# Patient Record
Sex: FEMALE | Race: WHITE | NOT HISPANIC OR LATINO
[De-identification: names, ages, dates, MRNs, and addresses within clinical notes are randomized per-mention and may not be internally consistent; named-entity substitution may affect disease eponyms.]

---

## 2017-01-17 ENCOUNTER — MEDICATION RENEWAL (OUTPATIENT)
Age: 72
End: 2017-01-17

## 2017-01-24 ENCOUNTER — MEDICATION RENEWAL (OUTPATIENT)
Age: 72
End: 2017-01-24

## 2017-03-06 ENCOUNTER — APPOINTMENT (OUTPATIENT)
Dept: INTERNAL MEDICINE | Facility: CLINIC | Age: 72
End: 2017-03-06

## 2017-03-06 VITALS
TEMPERATURE: 98.1 F | HEART RATE: 80 BPM | SYSTOLIC BLOOD PRESSURE: 108 MMHG | OXYGEN SATURATION: 100 % | HEIGHT: 70 IN | BODY MASS INDEX: 23.62 KG/M2 | WEIGHT: 165 LBS | DIASTOLIC BLOOD PRESSURE: 70 MMHG

## 2017-03-06 DIAGNOSIS — Z23 ENCOUNTER FOR IMMUNIZATION: ICD-10-CM

## 2017-03-06 RX ORDER — ACETAZOLAMIDE 250 MG/1
250 TABLET ORAL TWICE DAILY
Qty: 20 | Refills: 0 | Status: DISCONTINUED | COMMUNITY
Start: 2017-01-17 | End: 2017-03-06

## 2017-03-06 RX ORDER — ACETAZOLAMIDE 125 MG/1
125 TABLET ORAL TWICE DAILY
Qty: 14 | Refills: 0 | Status: DISCONTINUED | COMMUNITY
Start: 2017-01-24 | End: 2017-03-06

## 2017-03-10 LAB
ALBUMIN SERPL ELPH-MCNC: 4.6 G/DL
ALP BLD-CCNC: 63 U/L
ALT SERPL-CCNC: 18 U/L
ANION GAP SERPL CALC-SCNC: 18 MMOL/L
AST SERPL-CCNC: 24 U/L
BASOPHILS # BLD AUTO: 0.01 K/UL
BASOPHILS NFR BLD AUTO: 0.2 %
BILIRUB SERPL-MCNC: 0.4 MG/DL
BUN SERPL-MCNC: 16 MG/DL
CALCIUM SERPL-MCNC: 9.9 MG/DL
CHLORIDE SERPL-SCNC: 99 MMOL/L
CO2 SERPL-SCNC: 23 MMOL/L
CREAT SERPL-MCNC: 0.7 MG/DL
DEPRECATED D DIMER PPP IA-ACNC: 168 NG/ML DDU
EOSINOPHIL # BLD AUTO: 0.02 K/UL
EOSINOPHIL NFR BLD AUTO: 0.4 %
GLUCOSE SERPL-MCNC: 92 MG/DL
HCT VFR BLD CALC: 41 %
HGB BLD-MCNC: 13.4 G/DL
IMM GRANULOCYTES NFR BLD AUTO: 0.2 %
LYMPHOCYTES # BLD AUTO: 2.01 K/UL
LYMPHOCYTES NFR BLD AUTO: 39.4 %
MAN DIFF?: NORMAL
MCHC RBC-ENTMCNC: 31.4 PG
MCHC RBC-ENTMCNC: 32.7 GM/DL
MCV RBC AUTO: 96 FL
MONOCYTES # BLD AUTO: 0.31 K/UL
MONOCYTES NFR BLD AUTO: 6.1 %
NEUTROPHILS # BLD AUTO: 2.74 K/UL
NEUTROPHILS NFR BLD AUTO: 53.7 %
PLATELET # BLD AUTO: 239 K/UL
POTASSIUM SERPL-SCNC: 4.3 MMOL/L
PROT SERPL-MCNC: 7.7 G/DL
RBC # BLD: 4.27 M/UL
RBC # FLD: 14.1 %
SODIUM SERPL-SCNC: 140 MMOL/L
WBC # FLD AUTO: 5.1 K/UL

## 2017-06-06 ENCOUNTER — OTHER (OUTPATIENT)
Age: 72
End: 2017-06-06

## 2017-06-07 ENCOUNTER — RX RENEWAL (OUTPATIENT)
Age: 72
End: 2017-06-07

## 2017-09-05 ENCOUNTER — APPOINTMENT (OUTPATIENT)
Dept: INTERNAL MEDICINE | Facility: CLINIC | Age: 72
End: 2017-09-05
Payer: MEDICARE

## 2017-09-05 ENCOUNTER — NON-APPOINTMENT (OUTPATIENT)
Age: 72
End: 2017-09-05

## 2017-09-05 VITALS
HEIGHT: 70 IN | BODY MASS INDEX: 23.62 KG/M2 | DIASTOLIC BLOOD PRESSURE: 70 MMHG | OXYGEN SATURATION: 99 % | TEMPERATURE: 97.8 F | SYSTOLIC BLOOD PRESSURE: 120 MMHG | HEART RATE: 78 BPM | WEIGHT: 165 LBS

## 2017-09-05 DIAGNOSIS — R79.89 OTHER SPECIFIED ABNORMAL FINDINGS OF BLOOD CHEMISTRY: ICD-10-CM

## 2017-09-05 DIAGNOSIS — T70.29XA OTHER EFFECTS OF HIGH ALTITUDE, INITIAL ENCOUNTER: ICD-10-CM

## 2017-09-05 PROCEDURE — 36415 COLL VENOUS BLD VENIPUNCTURE: CPT

## 2017-09-05 PROCEDURE — G0009: CPT

## 2017-09-05 PROCEDURE — G0439: CPT

## 2017-09-05 PROCEDURE — 90732 PPSV23 VACC 2 YRS+ SUBQ/IM: CPT

## 2017-09-05 PROCEDURE — 93000 ELECTROCARDIOGRAM COMPLETE: CPT | Mod: 59

## 2017-09-06 LAB
BASOPHILS # BLD AUTO: 0 K/UL
BASOPHILS NFR BLD AUTO: 0 %
EOSINOPHIL # BLD AUTO: 0 K/UL
EOSINOPHIL NFR BLD AUTO: 0 %
HCT VFR BLD CALC: 39.4 %
HGB BLD-MCNC: 12.6 G/DL
IMM GRANULOCYTES NFR BLD AUTO: 0 %
LYMPHOCYTES # BLD AUTO: 1.48 K/UL
LYMPHOCYTES NFR BLD AUTO: 35.4 %
MAN DIFF?: NORMAL
MCHC RBC-ENTMCNC: 30.9 PG
MCHC RBC-ENTMCNC: 32 GM/DL
MCV RBC AUTO: 96.6 FL
MONOCYTES # BLD AUTO: 0.26 K/UL
MONOCYTES NFR BLD AUTO: 6.2 %
NEUTROPHILS # BLD AUTO: 2.44 K/UL
NEUTROPHILS NFR BLD AUTO: 58.4 %
PLATELET # BLD AUTO: 253 K/UL
RBC # BLD: 4.08 M/UL
RBC # FLD: 14.6 %
WBC # FLD AUTO: 4.18 K/UL

## 2017-09-11 ENCOUNTER — MEDICATION RENEWAL (OUTPATIENT)
Age: 72
End: 2017-09-11

## 2017-09-11 LAB
25(OH)D3 SERPL-MCNC: 38.2 NG/ML
ALBUMIN SERPL ELPH-MCNC: 4.5 G/DL
ALP BLD-CCNC: 62 U/L
ALT SERPL-CCNC: 22 U/L
ANION GAP SERPL CALC-SCNC: 16 MMOL/L
APPEARANCE: CLEAR
AST SERPL-CCNC: 23 U/L
BACTERIA: NEGATIVE
BILIRUB SERPL-MCNC: 0.3 MG/DL
BILIRUBIN URINE: NEGATIVE
BLOOD URINE: NEGATIVE
BUN SERPL-MCNC: 13 MG/DL
CALCIUM SERPL-MCNC: 9.6 MG/DL
CHLORIDE SERPL-SCNC: 102 MMOL/L
CHOLEST SERPL-MCNC: 216 MG/DL
CHOLEST/HDLC SERPL: 2.5 RATIO
CO2 SERPL-SCNC: 24 MMOL/L
COLOR: YELLOW
CREAT SERPL-MCNC: 0.75 MG/DL
GLUCOSE QUALITATIVE U: NORMAL MG/DL
GLUCOSE SERPL-MCNC: 101 MG/DL
HBA1C MFR BLD HPLC: 5.8 %
HDLC SERPL-MCNC: 88 MG/DL
HYALINE CASTS: 9 /LPF
IRON SERPL-MCNC: 94 UG/DL
KETONES URINE: NEGATIVE
LDLC SERPL CALC-MCNC: 109 MG/DL
LEUKOCYTE ESTERASE URINE: NEGATIVE
MICROSCOPIC-UA: NORMAL
NITRITE URINE: NEGATIVE
PH URINE: 6
POTASSIUM SERPL-SCNC: 4.4 MMOL/L
PROT SERPL-MCNC: 7.7 G/DL
PROTEIN URINE: NEGATIVE MG/DL
RED BLOOD CELLS URINE: 10 /HPF
SODIUM SERPL-SCNC: 142 MMOL/L
SPECIFIC GRAVITY URINE: 1.02
SQUAMOUS EPITHELIAL CELLS: 5 /HPF
TRIGL SERPL-MCNC: 94 MG/DL
TSH SERPL-ACNC: 1.71 UIU/ML
UROBILINOGEN URINE: NORMAL MG/DL
WHITE BLOOD CELLS URINE: 2 /HPF

## 2017-09-13 ENCOUNTER — RESULT REVIEW (OUTPATIENT)
Age: 72
End: 2017-09-13

## 2017-09-13 ENCOUNTER — APPOINTMENT (OUTPATIENT)
Dept: INTERNAL MEDICINE | Facility: CLINIC | Age: 72
End: 2017-09-13
Payer: MEDICARE

## 2017-09-13 PROCEDURE — G0008: CPT

## 2017-09-13 PROCEDURE — 90656 IIV3 VACC NO PRSV 0.5 ML IM: CPT

## 2017-09-18 ENCOUNTER — TRANSCRIPTION ENCOUNTER (OUTPATIENT)
Age: 72
End: 2017-09-18

## 2017-09-25 ENCOUNTER — APPOINTMENT (OUTPATIENT)
Dept: INTERNAL MEDICINE | Facility: CLINIC | Age: 72
End: 2017-09-25
Payer: MEDICARE

## 2017-09-25 PROCEDURE — 90471 IMMUNIZATION ADMIN: CPT

## 2017-09-25 PROCEDURE — G0008: CPT

## 2017-09-25 PROCEDURE — 90715 TDAP VACCINE 7 YRS/> IM: CPT

## 2017-09-25 PROCEDURE — 90662 IIV NO PRSV INCREASED AG IM: CPT

## 2017-11-25 ENCOUNTER — APPOINTMENT (OUTPATIENT)
Dept: INTERNAL MEDICINE | Facility: CLINIC | Age: 72
End: 2017-11-25
Payer: MEDICARE

## 2017-11-25 ENCOUNTER — NON-APPOINTMENT (OUTPATIENT)
Age: 72
End: 2017-11-25

## 2017-11-25 VITALS
DIASTOLIC BLOOD PRESSURE: 70 MMHG | SYSTOLIC BLOOD PRESSURE: 110 MMHG | TEMPERATURE: 97.7 F | OXYGEN SATURATION: 98 % | HEART RATE: 91 BPM | HEIGHT: 70 IN | WEIGHT: 161 LBS | BODY MASS INDEX: 23.05 KG/M2

## 2017-11-25 PROCEDURE — 99214 OFFICE O/P EST MOD 30 MIN: CPT | Mod: 25

## 2017-11-25 PROCEDURE — 93000 ELECTROCARDIOGRAM COMPLETE: CPT

## 2017-11-25 PROCEDURE — 36415 COLL VENOUS BLD VENIPUNCTURE: CPT

## 2017-11-27 ENCOUNTER — FORM ENCOUNTER (OUTPATIENT)
Age: 72
End: 2017-11-27

## 2017-11-27 LAB
25(OH)D3 SERPL-MCNC: 41.2 NG/ML
ALBUMIN SERPL ELPH-MCNC: 4.5 G/DL
ALP BLD-CCNC: 62 U/L
ALT SERPL-CCNC: 18 U/L
AMYLASE/CREAT SERPL: 60 U/L
ANION GAP SERPL CALC-SCNC: 12 MMOL/L
APPEARANCE: CLEAR
AST SERPL-CCNC: 22 U/L
BACTERIA: NEGATIVE
BASOPHILS # BLD AUTO: 0.01 K/UL
BASOPHILS NFR BLD AUTO: 0.2 %
BILIRUB SERPL-MCNC: 0.3 MG/DL
BILIRUBIN URINE: NEGATIVE
BLOOD URINE: ABNORMAL
BUN SERPL-MCNC: 14 MG/DL
CALCIUM SERPL-MCNC: 9.8 MG/DL
CHLORIDE SERPL-SCNC: 101 MMOL/L
CO2 SERPL-SCNC: 27 MMOL/L
COLOR: ABNORMAL
CREAT SERPL-MCNC: 0.83 MG/DL
EOSINOPHIL # BLD AUTO: 0.02 K/UL
EOSINOPHIL NFR BLD AUTO: 0.5
GLUCOSE QUALITATIVE U: NEGATIVE MG/DL
GLUCOSE SERPL-MCNC: 121 MG/DL
HBA1C MFR BLD HPLC: 5.7 %
HCT VFR BLD CALC: 40.9 %
HGB BLD-MCNC: 13.3 G/DL
HYALINE CASTS: 1 /LPF
IMM GRANULOCYTES NFR BLD AUTO: 0 %
KETONES URINE: NEGATIVE
LEUKOCYTE ESTERASE URINE: NEGATIVE
LPL SERPL-CCNC: 40 U/L
LYMPHOCYTES # BLD AUTO: 1.59 K/UL
LYMPHOCYTES NFR BLD AUTO: 37.1 %
MAN DIFF?: NORMAL
MCHC RBC-ENTMCNC: 31.6 PG
MCHC RBC-ENTMCNC: 32.5 GM/DL
MCV RBC AUTO: 97.1 FL
MICROSCOPIC-UA: NORMAL
MONOCYTES # BLD AUTO: 0.27 K/UL
MONOCYTES NFR BLD AUTO: 6.3 %
NEUTROPHILS # BLD AUTO: 2.4 K/UL
NEUTROPHILS NFR BLD AUTO: 55.9 %
NITRITE URINE: NEGATIVE
PH URINE: 6.5
PLATELET # BLD AUTO: 208 K/UL
POTASSIUM SERPL-SCNC: 4 MMOL/L
PROT SERPL-MCNC: 7.7 G/DL
PROTEIN URINE: NEGATIVE MG/DL
RBC # BLD: 4.21 M/UL
RBC # FLD: 13.4 %
RED BLOOD CELLS URINE: 7 /HPF
SODIUM SERPL-SCNC: 140 MMOL/L
SPECIFIC GRAVITY URINE: 1.01
SQUAMOUS EPITHELIAL CELLS: 1 /HPF
T4 FREE SERPL-MCNC: 1.4 NG/DL
TSH SERPL-ACNC: 1.8 UIU/ML
UROBILINOGEN URINE: NEGATIVE MG/DL
WBC # FLD AUTO: 4.29 K/UL
WHITE BLOOD CELLS URINE: 1 /HPF

## 2017-11-28 ENCOUNTER — APPOINTMENT (OUTPATIENT)
Dept: INTERNAL MEDICINE | Facility: CLINIC | Age: 72
End: 2017-11-28
Payer: MEDICARE

## 2017-11-28 ENCOUNTER — APPOINTMENT (OUTPATIENT)
Dept: ULTRASOUND IMAGING | Facility: CLINIC | Age: 72
End: 2017-11-28
Payer: MEDICARE

## 2017-11-28 ENCOUNTER — APPOINTMENT (OUTPATIENT)
Dept: RADIOLOGY | Facility: CLINIC | Age: 72
End: 2017-11-28
Payer: MEDICARE

## 2017-11-28 ENCOUNTER — OUTPATIENT (OUTPATIENT)
Dept: OUTPATIENT SERVICES | Facility: HOSPITAL | Age: 72
LOS: 1 days | End: 2017-11-28
Payer: MEDICARE

## 2017-11-28 VITALS
TEMPERATURE: 97.7 F | SYSTOLIC BLOOD PRESSURE: 110 MMHG | BODY MASS INDEX: 22.9 KG/M2 | HEART RATE: 81 BPM | HEIGHT: 70 IN | DIASTOLIC BLOOD PRESSURE: 70 MMHG | WEIGHT: 160 LBS | OXYGEN SATURATION: 99 %

## 2017-11-28 DIAGNOSIS — Z92.29 PERSONAL HISTORY OF OTHER DRUG THERAPY: ICD-10-CM

## 2017-11-28 DIAGNOSIS — Z23 ENCOUNTER FOR IMMUNIZATION: ICD-10-CM

## 2017-11-28 DIAGNOSIS — R07.89 OTHER CHEST PAIN: ICD-10-CM

## 2017-11-28 DIAGNOSIS — R10.13 EPIGASTRIC PAIN: ICD-10-CM

## 2017-11-28 LAB — TROPONIN I SERPL-MCNC: <0.01 NG/ML

## 2017-11-28 PROCEDURE — 76700 US EXAM ABDOM COMPLETE: CPT | Mod: 26

## 2017-11-28 PROCEDURE — 71020: CPT | Mod: 26

## 2017-11-28 PROCEDURE — 99214 OFFICE O/P EST MOD 30 MIN: CPT

## 2017-11-28 PROCEDURE — 71046 X-RAY EXAM CHEST 2 VIEWS: CPT

## 2017-11-28 PROCEDURE — 76700 US EXAM ABDOM COMPLETE: CPT

## 2017-11-29 PROBLEM — Z23 NEED FOR TDAP VACCINATION: Status: RESOLVED | Noted: 2017-09-13 | Resolved: 2017-11-29

## 2017-11-29 PROBLEM — R07.89 CHEST DISCOMFORT: Status: RESOLVED | Noted: 2017-11-25 | Resolved: 2017-11-29

## 2017-11-29 PROBLEM — Z92.29 HISTORY OF PNEUMOCOCCAL VACCINATION: Status: RESOLVED | Noted: 2017-09-05 | Resolved: 2017-11-29

## 2017-12-15 ENCOUNTER — EMERGENCY (EMERGENCY)
Facility: HOSPITAL | Age: 72
LOS: 1 days | Discharge: HOME | End: 2017-12-15
Attending: EMERGENCY MEDICINE | Admitting: EMERGENCY MEDICINE
Payer: MEDICARE

## 2017-12-15 VITALS
SYSTOLIC BLOOD PRESSURE: 106 MMHG | RESPIRATION RATE: 16 BRPM | TEMPERATURE: 98 F | DIASTOLIC BLOOD PRESSURE: 58 MMHG | OXYGEN SATURATION: 95 % | HEART RATE: 67 BPM

## 2017-12-15 VITALS
SYSTOLIC BLOOD PRESSURE: 194 MMHG | RESPIRATION RATE: 18 BRPM | DIASTOLIC BLOOD PRESSURE: 82 MMHG | OXYGEN SATURATION: 98 % | HEART RATE: 106 BPM

## 2017-12-15 LAB
ALBUMIN SERPL ELPH-MCNC: 4.5 G/DL — SIGNIFICANT CHANGE UP (ref 3.3–5)
ALP SERPL-CCNC: 59 U/L — SIGNIFICANT CHANGE UP (ref 40–120)
ALT FLD-CCNC: 12 U/L RC — SIGNIFICANT CHANGE UP (ref 10–45)
ANION GAP SERPL CALC-SCNC: 13 MMOL/L — SIGNIFICANT CHANGE UP (ref 5–17)
APPEARANCE UR: CLEAR — SIGNIFICANT CHANGE UP
APTT BLD: 29.9 SEC — SIGNIFICANT CHANGE UP (ref 27.5–37.4)
AST SERPL-CCNC: 15 U/L — SIGNIFICANT CHANGE UP (ref 10–40)
BASOPHILS # BLD AUTO: 0 K/UL — SIGNIFICANT CHANGE UP (ref 0–0.2)
BASOPHILS NFR BLD AUTO: 0.1 % — SIGNIFICANT CHANGE UP (ref 0–2)
BILIRUB SERPL-MCNC: 0.4 MG/DL — SIGNIFICANT CHANGE UP (ref 0.2–1.2)
BILIRUB UR-MCNC: NEGATIVE — SIGNIFICANT CHANGE UP
BUN SERPL-MCNC: 14 MG/DL — SIGNIFICANT CHANGE UP (ref 7–23)
CALCIUM SERPL-MCNC: 9.6 MG/DL — SIGNIFICANT CHANGE UP (ref 8.4–10.5)
CHLORIDE SERPL-SCNC: 102 MMOL/L — SIGNIFICANT CHANGE UP (ref 96–108)
CK MB BLD-MCNC: 1.9 % — SIGNIFICANT CHANGE UP (ref 0–3.5)
CK MB CFR SERPL CALC: 3.1 NG/ML — SIGNIFICANT CHANGE UP (ref 0–3.8)
CK SERPL-CCNC: 160 U/L — SIGNIFICANT CHANGE UP (ref 25–170)
CO2 SERPL-SCNC: 27 MMOL/L — SIGNIFICANT CHANGE UP (ref 22–31)
COLOR SPEC: SIGNIFICANT CHANGE UP
CREAT SERPL-MCNC: 0.74 MG/DL — SIGNIFICANT CHANGE UP (ref 0.5–1.3)
DIFF PNL FLD: ABNORMAL
EOSINOPHIL # BLD AUTO: 0 K/UL — SIGNIFICANT CHANGE UP (ref 0–0.5)
EOSINOPHIL NFR BLD AUTO: 0.5 % — SIGNIFICANT CHANGE UP (ref 0–6)
GLUCOSE SERPL-MCNC: 132 MG/DL — HIGH (ref 70–99)
GLUCOSE UR QL: NEGATIVE — SIGNIFICANT CHANGE UP
HBA1C BLD-MCNC: 5.7 % — HIGH (ref 4–5.6)
HCT VFR BLD CALC: 40.1 % — SIGNIFICANT CHANGE UP (ref 34.5–45)
HGB BLD-MCNC: 13.7 G/DL — SIGNIFICANT CHANGE UP (ref 11.5–15.5)
INR BLD: 1.04 RATIO — SIGNIFICANT CHANGE UP (ref 0.88–1.16)
KETONES UR-MCNC: ABNORMAL
LEUKOCYTE ESTERASE UR-ACNC: NEGATIVE — SIGNIFICANT CHANGE UP
LIDOCAIN IGE QN: 38 U/L — SIGNIFICANT CHANGE UP (ref 7–60)
LYMPHOCYTES # BLD AUTO: 1.7 K/UL — SIGNIFICANT CHANGE UP (ref 1–3.3)
LYMPHOCYTES # BLD AUTO: 31 % — SIGNIFICANT CHANGE UP (ref 13–44)
MCHC RBC-ENTMCNC: 33.4 PG — SIGNIFICANT CHANGE UP (ref 27–34)
MCHC RBC-ENTMCNC: 34 GM/DL — SIGNIFICANT CHANGE UP (ref 32–36)
MCV RBC AUTO: 98.1 FL — SIGNIFICANT CHANGE UP (ref 80–100)
MONOCYTES # BLD AUTO: 0.5 K/UL — SIGNIFICANT CHANGE UP (ref 0–0.9)
MONOCYTES NFR BLD AUTO: 8.3 % — SIGNIFICANT CHANGE UP (ref 2–14)
NEUTROPHILS # BLD AUTO: 3.3 K/UL — SIGNIFICANT CHANGE UP (ref 1.8–7.4)
NEUTROPHILS NFR BLD AUTO: 60.1 % — SIGNIFICANT CHANGE UP (ref 43–77)
NITRITE UR-MCNC: NEGATIVE — SIGNIFICANT CHANGE UP
PH UR: 7.5 — SIGNIFICANT CHANGE UP (ref 5–8)
PLATELET # BLD AUTO: 209 K/UL — SIGNIFICANT CHANGE UP (ref 150–400)
POTASSIUM SERPL-MCNC: 3.7 MMOL/L — SIGNIFICANT CHANGE UP (ref 3.5–5.3)
POTASSIUM SERPL-SCNC: 3.7 MMOL/L — SIGNIFICANT CHANGE UP (ref 3.5–5.3)
PROT SERPL-MCNC: 7.6 G/DL — SIGNIFICANT CHANGE UP (ref 6–8.3)
PROT UR-MCNC: NEGATIVE — SIGNIFICANT CHANGE UP
PROTHROM AB SERPL-ACNC: 11.2 SEC — SIGNIFICANT CHANGE UP (ref 9.8–12.7)
RBC # BLD: 4.09 M/UL — SIGNIFICANT CHANGE UP (ref 3.8–5.2)
RBC # FLD: 11.8 % — SIGNIFICANT CHANGE UP (ref 10.3–14.5)
RBC CASTS # UR COMP ASSIST: SIGNIFICANT CHANGE UP /HPF (ref 0–2)
SODIUM SERPL-SCNC: 142 MMOL/L — SIGNIFICANT CHANGE UP (ref 135–145)
SP GR SPEC: 1.01 — SIGNIFICANT CHANGE UP (ref 1.01–1.02)
TROPONIN T SERPL-MCNC: <0.01 NG/ML — SIGNIFICANT CHANGE UP (ref 0–0.06)
UROBILINOGEN FLD QL: NEGATIVE — SIGNIFICANT CHANGE UP
WBC # BLD: 5.6 K/UL — SIGNIFICANT CHANGE UP (ref 3.8–10.5)
WBC # FLD AUTO: 5.6 K/UL — SIGNIFICANT CHANGE UP (ref 3.8–10.5)
WBC UR QL: SIGNIFICANT CHANGE UP /HPF (ref 0–5)

## 2017-12-15 PROCEDURE — 75574 CT ANGIO HRT W/3D IMAGE: CPT | Mod: 26

## 2017-12-15 PROCEDURE — 85730 THROMBOPLASTIN TIME PARTIAL: CPT

## 2017-12-15 PROCEDURE — 93010 ELECTROCARDIOGRAM REPORT: CPT

## 2017-12-15 PROCEDURE — 82553 CREATINE MB FRACTION: CPT

## 2017-12-15 PROCEDURE — G0378: CPT

## 2017-12-15 PROCEDURE — 99236 HOSP IP/OBS SAME DATE HI 85: CPT | Mod: 25

## 2017-12-15 PROCEDURE — 85027 COMPLETE CBC AUTOMATED: CPT

## 2017-12-15 PROCEDURE — 81001 URINALYSIS AUTO W/SCOPE: CPT

## 2017-12-15 PROCEDURE — 99284 EMERGENCY DEPT VISIT MOD MDM: CPT | Mod: 25

## 2017-12-15 PROCEDURE — 85610 PROTHROMBIN TIME: CPT

## 2017-12-15 PROCEDURE — 96375 TX/PRO/DX INJ NEW DRUG ADDON: CPT | Mod: XU

## 2017-12-15 PROCEDURE — 71045 X-RAY EXAM CHEST 1 VIEW: CPT

## 2017-12-15 PROCEDURE — 84484 ASSAY OF TROPONIN QUANT: CPT

## 2017-12-15 PROCEDURE — 80053 COMPREHEN METABOLIC PANEL: CPT

## 2017-12-15 PROCEDURE — 75574 CT ANGIO HRT W/3D IMAGE: CPT

## 2017-12-15 PROCEDURE — 71010: CPT | Mod: 26

## 2017-12-15 PROCEDURE — 96374 THER/PROPH/DIAG INJ IV PUSH: CPT | Mod: XU

## 2017-12-15 PROCEDURE — 83036 HEMOGLOBIN GLYCOSYLATED A1C: CPT

## 2017-12-15 PROCEDURE — 83690 ASSAY OF LIPASE: CPT

## 2017-12-15 PROCEDURE — 87086 URINE CULTURE/COLONY COUNT: CPT

## 2017-12-15 PROCEDURE — 82550 ASSAY OF CK (CPK): CPT

## 2017-12-15 PROCEDURE — 93005 ELECTROCARDIOGRAM TRACING: CPT

## 2017-12-15 RX ORDER — ASPIRIN/CALCIUM CARB/MAGNESIUM 324 MG
324 TABLET ORAL ONCE
Qty: 0 | Refills: 0 | Status: COMPLETED | OUTPATIENT
Start: 2017-12-15 | End: 2017-12-15

## 2017-12-15 RX ORDER — ONDANSETRON 8 MG/1
4 TABLET, FILM COATED ORAL ONCE
Qty: 0 | Refills: 0 | Status: COMPLETED | OUTPATIENT
Start: 2017-12-15 | End: 2017-12-15

## 2017-12-15 RX ORDER — METOPROLOL TARTRATE 50 MG
100 TABLET ORAL ONCE
Qty: 0 | Refills: 0 | Status: COMPLETED | OUTPATIENT
Start: 2017-12-15 | End: 2017-12-15

## 2017-12-15 RX ORDER — ASPIRIN/CALCIUM CARB/MAGNESIUM 324 MG
81 TABLET ORAL DAILY
Qty: 0 | Refills: 0 | Status: DISCONTINUED | OUTPATIENT
Start: 2017-12-15 | End: 2017-12-19

## 2017-12-15 RX ORDER — SODIUM CHLORIDE 9 MG/ML
3 INJECTION INTRAMUSCULAR; INTRAVENOUS; SUBCUTANEOUS EVERY 8 HOURS
Qty: 0 | Refills: 0 | Status: DISCONTINUED | OUTPATIENT
Start: 2017-12-15 | End: 2017-12-19

## 2017-12-15 RX ADMIN — Medication 100 MILLIGRAM(S): at 14:35

## 2017-12-15 RX ADMIN — Medication 1 MILLIGRAM(S): at 16:46

## 2017-12-15 RX ADMIN — SODIUM CHLORIDE 3 MILLILITER(S): 9 INJECTION INTRAMUSCULAR; INTRAVENOUS; SUBCUTANEOUS at 14:46

## 2017-12-15 RX ADMIN — Medication 324 MILLIGRAM(S): at 08:25

## 2017-12-15 RX ADMIN — ONDANSETRON 4 MILLIGRAM(S): 8 TABLET, FILM COATED ORAL at 08:34

## 2017-12-15 NOTE — ED PROVIDER NOTE - CONSTITUTIONAL, MLM
normal... Well appearing, well nourished, awake, alert, oriented to person, place, time/situation and markedly anxious

## 2017-12-15 NOTE — ED CDU PROVIDER DISPOSITION NOTE - CLINICAL COURSE
73 y/o female with PMH of HLD on lipitor p/w epigastric pain and R flank pain. Per patient, pain started Monday before Thanksgiving, w/ discomfort in chest, radiating to R arm and R shoulder. Pain did not reappear until few days later - went to PMD, told she has a virus. Back to PMD this Tuesday, who also agreed with viral illness. Went for US for right upper quadrant which was unremarkable. "Discomfort" started again last night, radiating to R shoulder. No pain right now, states feels like a pressure. Did not sleep last night b/c of anxiety. Reports associated nausea - got zofran in EMS. Mother had CAD - no MI prior to 55. Never had a stress test. No abd pain, vomiting, diarrhea. No hx of DVT. No prolonged immobilization.     In ED patient reported intermittent epigastric chest discomfort past few weeks with radiation to right shoulder. Patient denies chest pain, shortness of breath, palpitations, or nausea at bedside. Troponin negative x1. Patient admitted to CDU. Second trop was negative. Patient went to both nuclear stress and CTC and couldn't tolerate either secondary to claustrophobia despite detailed discussions prior to both. Eventually discussed plan to give 1mg IV ativan to patient prior to CTC and was agreed upon with CTC team and care providers. CTC results showed non-obstructive disease. Advised patient regarding results meaning and follow-up. Attending made aware. Ready for discharge.

## 2017-12-15 NOTE — ED PROVIDER NOTE - MEDICAL DECISION MAKING DETAILS
[place] resident: 73 y/o female pmh of HLD p/w few weeks of atypical chest pain, epigastric radiating to R shoulder - mildly associated w/ nausea, never diaphoresis or vomiting. Had right upper quadrant US which was WNL ( Can see in PACS) - concern for possible atypical CP - never had a stress test - non smoker, no sig family hx of CAD - last pain last night, lasted 20 minutes - occurred at rest - will check labs, EKG, CXR, and admit vs CDU for CP [place] resident: 71 y/o female pmh of HLD p/w few weeks of atypical chest pain, epigastric radiating to R shoulder - mildly associated w/ nausea, never diaphoresis or vomiting. Had right upper quadrant US which was WNL ( Can see in PACS) - concern for possible atypical CP - never had a stress test - non smoker, no sig family hx of CAD - last pain last night, lasted 20 minutes - occurred at rest - will check labs, EKG, CXR, and admit vs CDU for CP    YI Rivers MD: 71 y/o female with PMH of HLD who p/w epigastric pain and R flank pain. Per patient, pain started Monday before Thanksgiving, w/ discomfort in chest, radiating to R arm and R shoulder. Pain did not reappear until few days later - went to PMD, told she has a virus. Back to PMD this Tuesday, who also agreed with viral illness. Went for US for right upper quadrant which was unremarkable. "Discomfort" started again last night, radiating to R shoulder. No pain right now, states feels like a pressure. Did not sleep last night b/c of anxiety. Reports associated nausea - got zofran in EMS. Mother had CAD - no MI prior to 55. Never had a stress test. No abd pain, vomiting, diarrhea. No hx of DVT. No prolonged immobilization. [place] resident: 71 y/o female pmh of HLD p/w few weeks of atypical chest pain, epigastric radiating to R shoulder - mildly associated w/ nausea, never diaphoresis or vomiting. Had right upper quadrant US which was WNL ( Can see in PACS) - concern for possible atypical CP - never had a stress test - non smoker, no sig family hx of CAD - last pain last night, lasted 20 minutes - occurred at rest - will check labs, EKG, CXR, and admit vs CDU for CP    YI Rivers MD: 71 y/o female with PMH of HLD who p/w intermittent epigastric pain, nausea and upper back pain. Per patient, pain started Monday before Thanksgiving, w/ discomfort in chest, radiating to R arm and R posterior shoulder. Pain did not reappear until few days later - went to PMD, told she has a virus. Back to PMD this Tuesday, who obtained US for right upper quadrant which was unremarkable. "Discomfort" started again last night, radiating to R shoulder. Denies pain currently. Reports associated nausea this AM - got zofran in EMS. Mother had CAD - no MI prior to 55. Never had a stress test. No abd pain, vomiting, diarrhea, SOB, travel/trauma/immobilization, back pain. No hx of DVT. No prolonged immobilization.  DDx: ACS, MSK pain, GI  Plan: ekg, trop, basic labs, CXR. If initial w/u negative, will admit to CDU for stress test and further cardiac w/u

## 2017-12-15 NOTE — ED CDU PROVIDER INITIAL DAY NOTE - PROGRESS NOTE DETAILS
Patient seen at bedside in NAD.  VSS.  Patient resting comfortably without complaints. Patient was unable to undergo initial nuclear stress test secondary to claustrophobia. Discussed CT-Coronary option with her and she states she'd be able to tolerate that much better as she's had head CTs in the past and would be able to undergo. it. Gave metoprolol 100 mg PO to lower heart rate prior to CTC. Will await results.   - Juan De PA-C Patient seen at bedside in NAD.  VSS.  Patient resting comfortably without complaints. Patient was unable to undergo initial nuclear stress test secondary to claustrophobia. Discussed CT-Coronary option with her and she states she'd be able to tolerate that much better as she's had head CTs in the past and would be able to undergo. it. Called CT Coronary who agreed with this plan and instructed to give metoprolol 100 mg PO to lower heart rate prior to CTC. Will await results.   - Juan De PA-C CT-Coronary called, stated patient could not tolerate CT-scan CT-Coronary called, stated patient could not tolerate CT-scan 2/2 claustrophobia.  -Marco Sal PA-C Case d/w Dr Rivers. Recommending 3rd trop and DC home with close outpatient cardiology follow up.  -NANCY LomasC Dr. Rivers at bedside.  Contacted CT coronary.  They are willing to attempt CT scan with ativan ppx.  Will reattempt. Patient tolerate CT scan, awaiting results.  -Marco Sal PA-C CTC results back. Discussed results at bedside and follow-up. - Juan De PA-C

## 2017-12-15 NOTE — ED PROVIDER NOTE - OBJECTIVE STATEMENT
71 y/o female with PMH of HLD on lipitor p/w epigastric pain and R flank pain. Per patient, pain started Monday before Thanksgiving, w/ discomfort in chest, radiating to R arm and R shoulder. Pain did not reappear until few days later - went to PMD, told she has a virus. Back to PMD this Tuesday, who also agreed with viral illness. Went for US for right upper quadrant which was unremarkable. "Discomfort" started again last night, radiating to R shoulder. No pain right now, states feels like a pressure. Did not sleep last night b/c of anxiety. Reports associated nausea - got zofran in EMS. Mother had CAD - no MI prior to 55. Never had a stress test. No abd pain, vomiting, diarrhea. No hx of DVT. No prolonged immobilization.     PMD: Dr. Jacque Moise

## 2017-12-15 NOTE — ED PROVIDER NOTE - MUSCULOSKELETAL, MLM
Strength appropriate for age. Full active range of motion of all 4 extremities. No pain with ROM of R shoulder. No tenderness over R scapula.

## 2017-12-15 NOTE — ED CDU PROVIDER INITIAL DAY NOTE - MEDICAL DECISION MAKING DETAILS
YI Rivers MD: 71 y/o female with PMH of HLD who p/w intermittent epigastric pain, nausea and upper back pain. Per patient, pain started Monday before Thanksgiving, w/ discomfort in chest, radiating to R arm and R posterior shoulder. Pain did not reappear until few days later - went to PMD, told she has a virus. Back to PMD this Tuesday, who obtained US for right upper quadrant which was unremarkable. "Discomfort" started again last night, radiating to R shoulder. Denies pain currently. Reports associated nausea this AM - got zofran in EMS. Mother had CAD - no MI prior to 55. Never had a stress test. No abd pain, vomiting, diarrhea, SOB, travel/trauma/immobilization, back pain. No hx of DVT. No prolonged immobilization.  DDx: ACS, MSK pain, GI. Ekg, trop, basic labs, CXR negative. Will admit to CDU for serial troponins, stress test and further cardiac w/u. If unable to obtain a stress test today, may obtain CCTA.

## 2017-12-15 NOTE — ED CDU PROVIDER DISPOSITION NOTE - ATTENDING CONTRIBUTION TO CARE
I Mauricio Abraham MD have personally performed a face to face diagnostic evaluation on this patient.  I have reviewed the ACP note and agree with the history, exam, and plan of care, except as noted.   My medical decision making and observations are found above.  Lungs clear, chest pain resolved.   discussed further care for pt with cardiology.  The patient is stable for discharge home and will follow up with their primary physician.

## 2017-12-15 NOTE — ED CDU PROVIDER DISPOSITION NOTE - PLAN OF CARE
1. Follow up with your PMD and/or cardiologist ([phone]) within 48-72 hours.   2. Show copies of your reports given to you. Recommend Aspirin 81mg over the counter daily until further evaluation.  Take all of your other medications as previously prescribed.   3. Worsening or continued chest pain, shortness of breath, weakness, return to ED.

## 2017-12-15 NOTE — ED CDU PROVIDER INITIAL DAY NOTE - OBJECTIVE STATEMENT
71 y/o female with PMH of HLD on lipitor p/w epigastric pain and R flank pain. Per patient, pain started Monday before Thanksgiving, w/ discomfort in chest, radiating to R arm and R shoulder. Pain did not reappear until few days later - went to PMD, told she has a virus. Back to PMD this Tuesday, who also agreed with viral illness. Went for US for right upper quadrant which was unremarkable. "Discomfort" started again last night, radiating to R shoulder. No pain right now, states feels like a pressure. Did not sleep last night b/c of anxiety. Reports associated nausea - got zofran in EMS. Mother had CAD - no MI prior to 55. Never had a stress test. No abd pain, vomiting, diarrhea. No hx of DVT. No prolonged immobilization.     In ED patient reported intermittent epigastric chest discomfort past few weeks with radiation to right shoulder. Patient denies chest pain, shortness of breath, palpitations, or nausea at bedside. Troponin negative x1. Will admit to CDU for monitoring, frequent repeat troponin and ekg @ 12:00, with stress test following if second trop negative.

## 2017-12-15 NOTE — ED ADULT NURSE NOTE - OBJECTIVE STATEMENT
72 yr old female arrived to ED via EMS c/o nausea, epigastric pain, & back pain. She states she has felt this pain along with malaise come and go since the end of november but the pain worsened last night and began radiating to her back. upon assessment the pt is A&O x3, neuro grossly intact, abdomen soft, tender, and non distended. skin is clean, dry and intact. pt appears tachycardic and anxious. Will continue to monitor. 72 yr old female arrived to ED via EMS c/o nausea, epigastric pain, & back pain. She states she has felt this pain along with transient malaise since the end of november/ pain worsened last night and began radiating to her back. upon assessment the pt is A&O x3, neuro grossly intact, abdomen soft, tender, and non distended. Skin WDI. Pt appears tachycardic and anxious. Will continue to monitor.

## 2017-12-16 LAB
CULTURE RESULTS: NO GROWTH — SIGNIFICANT CHANGE UP
SPECIMEN SOURCE: SIGNIFICANT CHANGE UP

## 2017-12-18 ENCOUNTER — MEDICATION RENEWAL (OUTPATIENT)
Age: 72
End: 2017-12-18

## 2018-03-06 ENCOUNTER — APPOINTMENT (OUTPATIENT)
Dept: INTERNAL MEDICINE | Facility: CLINIC | Age: 73
End: 2018-03-06
Payer: MEDICARE

## 2018-03-06 VITALS
BODY MASS INDEX: 22.26 KG/M2 | SYSTOLIC BLOOD PRESSURE: 120 MMHG | TEMPERATURE: 97.2 F | OXYGEN SATURATION: 98 % | DIASTOLIC BLOOD PRESSURE: 70 MMHG | HEIGHT: 71 IN | HEART RATE: 75 BPM | WEIGHT: 159 LBS

## 2018-03-06 DIAGNOSIS — R53.81 OTHER MALAISE: ICD-10-CM

## 2018-03-06 DIAGNOSIS — R10.13 EPIGASTRIC PAIN: ICD-10-CM

## 2018-03-06 DIAGNOSIS — R53.83 OTHER MALAISE: ICD-10-CM

## 2018-03-06 DIAGNOSIS — Z87.898 PERSONAL HISTORY OF OTHER SPECIFIED CONDITIONS: ICD-10-CM

## 2018-03-06 PROCEDURE — 94010 BREATHING CAPACITY TEST: CPT

## 2018-03-06 PROCEDURE — 99214 OFFICE O/P EST MOD 30 MIN: CPT | Mod: 25

## 2018-03-07 PROBLEM — R10.13 ACUTE EPIGASTRIC PAIN: Status: RESOLVED | Noted: 2017-11-25 | Resolved: 2018-03-07

## 2018-03-07 PROBLEM — Z87.898 HISTORY OF CHEST PAIN: Status: RESOLVED | Noted: 2017-11-28 | Resolved: 2018-03-07

## 2018-03-07 PROBLEM — R53.81 MALAISE AND FATIGUE: Status: RESOLVED | Noted: 2017-11-25 | Resolved: 2018-03-07

## 2018-08-16 ENCOUNTER — TRANSCRIPTION ENCOUNTER (OUTPATIENT)
Age: 73
End: 2018-08-16

## 2018-09-24 PROBLEM — E78.5 HYPERLIPIDEMIA, UNSPECIFIED: Chronic | Status: ACTIVE | Noted: 2017-12-15

## 2018-09-26 ENCOUNTER — APPOINTMENT (OUTPATIENT)
Dept: INTERNAL MEDICINE | Facility: CLINIC | Age: 73
End: 2018-09-26
Payer: MEDICARE

## 2018-09-26 ENCOUNTER — NON-APPOINTMENT (OUTPATIENT)
Age: 73
End: 2018-09-26

## 2018-09-26 VITALS
WEIGHT: 161 LBS | DIASTOLIC BLOOD PRESSURE: 66 MMHG | SYSTOLIC BLOOD PRESSURE: 108 MMHG | HEART RATE: 70 BPM | OXYGEN SATURATION: 98 % | BODY MASS INDEX: 22.54 KG/M2 | HEIGHT: 71 IN | TEMPERATURE: 98.3 F

## 2018-09-26 DIAGNOSIS — Z12.31 ENCOUNTER FOR SCREENING MAMMOGRAM FOR MALIGNANT NEOPLASM OF BREAST: ICD-10-CM

## 2018-09-26 DIAGNOSIS — Z23 ENCOUNTER FOR IMMUNIZATION: ICD-10-CM

## 2018-09-26 DIAGNOSIS — Z87.09 PERSONAL HISTORY OF OTHER DISEASES OF THE RESPIRATORY SYSTEM: ICD-10-CM

## 2018-09-26 DIAGNOSIS — R73.01 IMPAIRED FASTING GLUCOSE: ICD-10-CM

## 2018-09-26 PROCEDURE — 90662 IIV NO PRSV INCREASED AG IM: CPT

## 2018-09-26 PROCEDURE — 36415 COLL VENOUS BLD VENIPUNCTURE: CPT

## 2018-09-26 PROCEDURE — 90472 IMMUNIZATION ADMIN EACH ADD: CPT | Mod: GY

## 2018-09-26 PROCEDURE — G0444 DEPRESSION SCREEN ANNUAL: CPT | Mod: 59

## 2018-09-26 PROCEDURE — G0008: CPT

## 2018-09-26 PROCEDURE — G0439: CPT

## 2018-09-26 PROCEDURE — 90750 HZV VACC RECOMBINANT IM: CPT | Mod: GY

## 2018-09-26 PROCEDURE — 93000 ELECTROCARDIOGRAM COMPLETE: CPT | Mod: 59

## 2018-09-26 RX ORDER — BENZONATATE 100 MG/1
100 CAPSULE ORAL EVERY 8 HOURS
Qty: 30 | Refills: 2 | Status: DISCONTINUED | COMMUNITY
Start: 2018-03-06 | End: 2018-09-26

## 2018-09-26 RX ORDER — FLUTICASONE PROPIONATE 50 UG/1
50 SPRAY, METERED NASAL
Qty: 16 | Refills: 4 | Status: DISCONTINUED | COMMUNITY
Start: 2018-03-06 | End: 2018-09-26

## 2018-09-26 RX ORDER — FLUTICASONE FUROATE AND VILANTEROL TRIFENATATE 200; 25 UG/1; UG/1
200-25 POWDER RESPIRATORY (INHALATION)
Qty: 1 | Refills: 1 | Status: DISCONTINUED | COMMUNITY
Start: 2018-03-06 | End: 2018-09-26

## 2018-09-26 NOTE — PAST MEDICAL HISTORY
[Postmenopausal] : postmenopausal [Menarche Age ____] : age at menarche was [unfilled] [Menopause Age____] : age at menopause was [unfilled] [Total Preg ___] : G[unfilled] [Live Births ___] : P[unfilled]  [Full Term ___] : Full Term: [unfilled] [Living ___] : Living: [unfilled] [Multiple Births ___] :  multiple birth pregnancies: [unfilled]

## 2018-09-27 PROBLEM — Z87.09 HISTORY OF PERSISTENT COUGH: Status: RESOLVED | Noted: 2018-03-06 | Resolved: 2018-09-27

## 2018-09-27 NOTE — REVIEW OF SYSTEMS
[Negative] : Heme/Lymph [Vision Problems] : vision problems [Hearing Loss] : hearing loss [Anxiety] : anxiety

## 2018-09-27 NOTE — PHYSICAL EXAM
[No Acute Distress] : no acute distress [Well Nourished] : well nourished [Well Developed] : well developed [Well-Appearing] : well-appearing [Normal Sclera/Conjunctiva] : normal sclera/conjunctiva [PERRL] : pupils equal round and reactive to light [EOMI] : extraocular movements intact [Normal Outer Ear/Nose] : the outer ears and nose were normal in appearance [Normal Oropharynx] : the oropharynx was normal [No JVD] : no jugular venous distention [Supple] : supple [No Lymphadenopathy] : no lymphadenopathy [No Respiratory Distress] : no respiratory distress  [Clear to Auscultation] : lungs were clear to auscultation bilaterally [No Accessory Muscle Use] : no accessory muscle use [Normal Rate] : normal rate  [Regular Rhythm] : with a regular rhythm [Normal S1, S2] : normal S1 and S2 [No Carotid Bruits] : no carotid bruits [Pedal Pulses Present] : the pedal pulses are present [No Edema] : there was no peripheral edema [No Extremity Clubbing/Cyanosis] : no extremity clubbing/cyanosis [Soft] : abdomen soft [Non Tender] : non-tender [Non-distended] : non-distended [No HSM] : no HSM [Normal Bowel Sounds] : normal bowel sounds [Normal Posterior Cervical Nodes] : no posterior cervical lymphadenopathy [Normal Anterior Cervical Nodes] : no anterior cervical lymphadenopathy [No CVA Tenderness] : no CVA  tenderness [No Spinal Tenderness] : no spinal tenderness [Grossly Normal Strength/Tone] : grossly normal strength/tone [No Rash] : no rash [Normal Gait] : normal gait [Coordination Grossly Intact] : coordination grossly intact [No Focal Deficits] : no focal deficits [Deep Tendon Reflexes (DTR)] : deep tendon reflexes were 2+ and symmetric [Normal Affect] : the affect was normal [Normal Voice/Communication] : normal voice/communication [Normal TMs] : both tympanic membranes were normal [Normal Appearance] : normal in appearance [No Masses] : no palpable masses [No Nipple Discharge] : no nipple discharge [No Axillary Lymphadenopathy] : no axillary lymphadenopathy [Normal Supraclavicular Nodes] : no supraclavicular lymphadenopathy [Normal Axillary Nodes] : no axillary lymphadenopathy [Scoliosis] : scoliosis [Speech Grossly Normal] : speech grossly normal [Alert and Oriented x3] : oriented to person, place, and time [de-identified] : No ST [de-identified] : No TM or stridor [de-identified] : R=16 [de-identified] : normal radial pulses; no cords

## 2018-09-27 NOTE — HEALTH RISK ASSESSMENT
[Excellent] : ~his/her~  mood as  excellent [Intercurrent ED visits] : went to ED [No falls in past year] : Patient reported no falls in the past year [0] : 2) Feeling down, depressed, or hopeless: Not at all (0) [HIV Test offered] : HIV Test offered [Hepatitis C test offered] : Hepatitis C test offered [None] : None [Alone] : lives alone [# of Members in Household ___] :  household currently consist of [unfilled] member(s) [Retired] : retired [College] : College [] :  [# Of Children ___] : has [unfilled] children [Feels Safe at Home] : Feels safe at home [Fully functional (bathing, dressing, toileting, transferring, walking, feeding)] : Fully functional (bathing, dressing, toileting, transferring, walking, feeding) [Fully functional (using the telephone, shopping, preparing meals, housekeeping, doing laundry, using] : Fully functional and needs no help or supervision to perform IADLs (using the telephone, shopping, preparing meals, housekeeping, doing laundry, using transportation, managing medications and managing finances) [Reports changes in dental health] : Reports changes in dental health [Smoke Detector] : smoke detector [Carbon Monoxide Detector] : carbon monoxide detector [Seat Belt] :  uses seat belt [Sunscreen] : uses sunscreen [Discussed at today's visit] : Advance Directives Discussed at today's visit [Designated Healthcare Proxy] : Designated healthcare proxy [Name: ___] : Health Care Proxy's Name: [unfilled]  [Relationship: ___] : Relationship: [unfilled] [FreeTextEntry1] : none [] : No [de-identified] : GI, Uro, ophtho, derm, dentist [[id number]] : 0 [Change in mental status noted] : No change in mental status noted [Sexually Active] : not sexually active [Reports changes in hearing] : Reports no changes in hearing [Reports changes in vision] : Reports no changes in vision [Guns at Home] : no guns at home [Travel to Developing Areas] : does not  travel to developing areas [TB Exposure] : is not being exposed to tuberculosis [Caregiver Concerns] : does not have caregiver concerns [MammogramDate] : 06/17 [PapSmearDate] : 2017 [BoneDensityDate] : 05/16 [ColonoscopyDate] : 09/17

## 2018-09-27 NOTE — COUNSELING
[Healthy eating counseling provided] : healthy eating [Activity counseling provided] : activity [Low Fat Diet] : Low fat diet [Walking] : Walking [None] : None [Good understanding] : Patient has a good understanding of lifestyle changes and the steps needed to achieve self management goals [ - Annual Depression Screening] : Annual Depression Screening

## 2018-09-27 NOTE — ASSESSMENT
[FreeTextEntry1] : See health maintenance assessment and plan outlined below.\par In addition:\par Full labs and urine sent today\par Bone density due 2018\par Podiatry f/u as needed\par Had colonoscopy 2017\par To see GYN\par To do mammograms\par Urology f/u as needed\par Continue meds, diet, exercise as outlined\par EKG done and report d/w patient and scanned\par Cardiology referrals given at her request\par CXR declined\par Vaccines d/w patient\par ENT f/u as needed\par To see derm, ophtho, dentist\par Xanax RX renewed at her request\par RTC for annual CPE and as needed\par To call for any medical issues

## 2018-09-27 NOTE — HISTORY OF PRESENT ILLNESS
[FreeTextEntry1] : Comes in for annual physical. [de-identified] : Feeling well with no complaints.

## 2018-09-28 LAB
25(OH)D3 SERPL-MCNC: 42.9 NG/ML
ALBUMIN SERPL ELPH-MCNC: 4.6 G/DL
ALP BLD-CCNC: 65 U/L
ALT SERPL-CCNC: 13 U/L
ANION GAP SERPL CALC-SCNC: 17 MMOL/L
APPEARANCE: CLEAR
AST SERPL-CCNC: 21 U/L
BACTERIA: NEGATIVE
BASOPHILS # BLD AUTO: 0.01 K/UL
BASOPHILS NFR BLD AUTO: 0.3 %
BILIRUB SERPL-MCNC: 0.4 MG/DL
BILIRUBIN URINE: NEGATIVE
BLOOD URINE: NEGATIVE
BUN SERPL-MCNC: 12 MG/DL
CALCIUM SERPL-MCNC: 9.9 MG/DL
CHLORIDE SERPL-SCNC: 101 MMOL/L
CHOLEST SERPL-MCNC: 200 MG/DL
CHOLEST/HDLC SERPL: 2.5 RATIO
CO2 SERPL-SCNC: 24 MMOL/L
COLOR: YELLOW
CREAT SERPL-MCNC: 0.73 MG/DL
EOSINOPHIL # BLD AUTO: 0.03 K/UL
EOSINOPHIL NFR BLD AUTO: 0.8 %
FOLATE SERPL-MCNC: >20 NG/ML
GLUCOSE QUALITATIVE U: NEGATIVE MG/DL
GLUCOSE SERPL-MCNC: 98 MG/DL
HBA1C MFR BLD HPLC: 6 %
HCT VFR BLD CALC: 40.5 %
HCV AB SER QL: NONREACTIVE
HCV S/CO RATIO: 0.1 S/CO
HDLC SERPL-MCNC: 81 MG/DL
HGB BLD-MCNC: 13.3 G/DL
HIV1+2 AB SPEC QL IA.RAPID: NONREACTIVE
HYALINE CASTS: 3 /LPF
IMM GRANULOCYTES NFR BLD AUTO: 0.3 %
IRON SERPL-MCNC: 104 UG/DL
KETONES URINE: NEGATIVE
LDLC SERPL CALC-MCNC: 100 MG/DL
LEUKOCYTE ESTERASE URINE: NEGATIVE
LYMPHOCYTES # BLD AUTO: 1.4 K/UL
LYMPHOCYTES NFR BLD AUTO: 39.3 %
MAN DIFF?: NORMAL
MCHC RBC-ENTMCNC: 31.7 PG
MCHC RBC-ENTMCNC: 32.8 GM/DL
MCV RBC AUTO: 96.7 FL
MICROSCOPIC-UA: NORMAL
MONOCYTES # BLD AUTO: 0.22 K/UL
MONOCYTES NFR BLD AUTO: 6.2 %
NEUTROPHILS # BLD AUTO: 1.89 K/UL
NEUTROPHILS NFR BLD AUTO: 53.1 %
NITRITE URINE: NEGATIVE
PH URINE: 8
PLATELET # BLD AUTO: 205 K/UL
POTASSIUM SERPL-SCNC: 4.5 MMOL/L
PROT SERPL-MCNC: 7.4 G/DL
PROTEIN URINE: ABNORMAL MG/DL
RBC # BLD: 4.19 M/UL
RBC # FLD: 14.8 %
RED BLOOD CELLS URINE: 7 /HPF
SODIUM SERPL-SCNC: 142 MMOL/L
SPECIFIC GRAVITY URINE: 1.02
SQUAMOUS EPITHELIAL CELLS: 2 /HPF
TRIGL SERPL-MCNC: 94 MG/DL
TSH SERPL-ACNC: 1.69 UIU/ML
UROBILINOGEN URINE: NEGATIVE MG/DL
VIT B12 SERPL-MCNC: 402 PG/ML
WBC # FLD AUTO: 3.56 K/UL
WHITE BLOOD CELLS URINE: 1 /HPF

## 2018-10-10 ENCOUNTER — RESULT REVIEW (OUTPATIENT)
Age: 73
End: 2018-10-10

## 2018-12-17 ENCOUNTER — MEDICATION RENEWAL (OUTPATIENT)
Age: 73
End: 2018-12-17

## 2019-03-05 ENCOUNTER — APPOINTMENT (OUTPATIENT)
Dept: INTERNAL MEDICINE | Facility: CLINIC | Age: 74
End: 2019-03-05
Payer: MEDICARE

## 2019-03-05 PROCEDURE — 90750 HZV VACC RECOMBINANT IM: CPT | Mod: GY

## 2019-03-05 PROCEDURE — 90471 IMMUNIZATION ADMIN: CPT | Mod: GY

## 2019-03-09 ENCOUNTER — TRANSCRIPTION ENCOUNTER (OUTPATIENT)
Age: 74
End: 2019-03-09

## 2019-05-30 ENCOUNTER — MEDICATION RENEWAL (OUTPATIENT)
Age: 74
End: 2019-05-30

## 2019-06-22 ENCOUNTER — TRANSCRIPTION ENCOUNTER (OUTPATIENT)
Age: 74
End: 2019-06-22

## 2019-06-26 ENCOUNTER — RX RENEWAL (OUTPATIENT)
Age: 74
End: 2019-06-26

## 2019-07-11 ENCOUNTER — MEDICATION RENEWAL (OUTPATIENT)
Age: 74
End: 2019-07-11

## 2019-08-06 ENCOUNTER — APPOINTMENT (OUTPATIENT)
Dept: INTERNAL MEDICINE | Facility: CLINIC | Age: 74
End: 2019-08-06
Payer: MEDICARE

## 2019-08-06 PROCEDURE — 36415 COLL VENOUS BLD VENIPUNCTURE: CPT

## 2019-08-09 LAB
ESTIMATED AVERAGE GLUCOSE: 120 MG/DL
GLUCOSE BS SERPL-MCNC: 101 MG/DL
HBA1C MFR BLD HPLC: 5.8 %

## 2019-08-21 ENCOUNTER — TRANSCRIPTION ENCOUNTER (OUTPATIENT)
Age: 74
End: 2019-08-21

## 2019-09-24 ENCOUNTER — APPOINTMENT (OUTPATIENT)
Dept: INTERNAL MEDICINE | Facility: CLINIC | Age: 74
End: 2019-09-24
Payer: MEDICARE

## 2019-09-24 ENCOUNTER — LABORATORY RESULT (OUTPATIENT)
Age: 74
End: 2019-09-24

## 2019-09-24 VITALS
SYSTOLIC BLOOD PRESSURE: 110 MMHG | HEIGHT: 69.5 IN | HEART RATE: 95 BPM | TEMPERATURE: 97.7 F | DIASTOLIC BLOOD PRESSURE: 60 MMHG | WEIGHT: 159 LBS | BODY MASS INDEX: 23.02 KG/M2 | OXYGEN SATURATION: 97 %

## 2019-09-24 DIAGNOSIS — L30.9 DERMATITIS, UNSPECIFIED: ICD-10-CM

## 2019-09-24 DIAGNOSIS — R05 COUGH: ICD-10-CM

## 2019-09-24 PROCEDURE — 36415 COLL VENOUS BLD VENIPUNCTURE: CPT

## 2019-09-24 PROCEDURE — 99214 OFFICE O/P EST MOD 30 MIN: CPT | Mod: 25

## 2019-09-24 RX ORDER — TRIAMCINOLONE ACETONIDE 1 MG/G
0.1 CREAM TOPICAL
Qty: 80 | Refills: 0 | Status: ACTIVE | COMMUNITY
Start: 2019-09-16

## 2019-09-26 RX ORDER — TOBRAMYCIN AND DEXAMETHASONE 3; 1 MG/ML; MG/ML
0.3-0.1 SUSPENSION/ DROPS OPHTHALMIC
Qty: 5 | Refills: 0 | Status: DISCONTINUED | COMMUNITY
Start: 2019-08-08 | End: 2019-09-26

## 2019-09-26 RX ORDER — RANITIDINE 150 MG/1
150 TABLET ORAL
Qty: 30 | Refills: 0 | Status: DISCONTINUED | COMMUNITY
Start: 2017-11-28 | End: 2019-09-26

## 2019-09-26 RX ORDER — ONDANSETRON 4 MG/1
4 TABLET ORAL
Qty: 30 | Refills: 0 | Status: DISCONTINUED | COMMUNITY
Start: 2017-12-18 | End: 2019-09-26

## 2019-09-26 RX ORDER — DICLOFENAC SODIUM 10 MG/G
1 GEL TOPICAL
Qty: 300 | Refills: 0 | Status: DISCONTINUED | COMMUNITY
Start: 2017-09-06 | End: 2019-09-26

## 2019-09-26 NOTE — REVIEW OF SYSTEMS
[Vision Problems] : vision problems [Cough] : cough [Hearing Loss] : hearing loss [Anxiety] : anxiety [Skin Rash] : skin rash [Negative] : Heme/Lymph

## 2019-09-26 NOTE — ASSESSMENT
[FreeTextEntry1] : Dermatitis\par Not hives\par ?etiology\par Labs below sent\par Derm f/u\par Await skin biopsy results\par Continue topical therapy as per derm\par Pepcid 20 mg daily\par Claritin 10 mg daily\par Benadryl 25 mg as needed\par \par Cough\par ? allergies\par ?URI\par Rest\par Fluids\par Mucinex DM bid\par Gargles/lozenges\par Steam\par Saline nasal rinses\par On AH \par Can add Flonase daily\par To call if worse or if not improving\par \par RTC for CPE and as needed\par To call for any medical issues\par Flu vaccine declined today

## 2019-09-26 NOTE — PHYSICAL EXAM
[No Acute Distress] : no acute distress [Well Nourished] : well nourished [Well Developed] : well developed [Well-Appearing] : well-appearing [Normal Voice/Communication] : normal voice/communication [Normal Sclera/Conjunctiva] : normal sclera/conjunctiva [PERRL] : pupils equal round and reactive to light [EOMI] : extraocular movements intact [Normal Oropharynx] : the oropharynx was normal [Normal Outer Ear/Nose] : the outer ears and nose were normal in appearance [Normal TMs] : both tympanic membranes were normal [No JVD] : no jugular venous distention [No Lymphadenopathy] : no lymphadenopathy [Supple] : supple [No Respiratory Distress] : no respiratory distress  [Clear to Auscultation] : lungs were clear to auscultation bilaterally [No Accessory Muscle Use] : no accessory muscle use [Normal Rate] : normal rate  [Regular Rhythm] : with a regular rhythm [Normal S1, S2] : normal S1 and S2 [No Carotid Bruits] : no carotid bruits [No Edema] : there was no peripheral edema [No Extremity Clubbing/Cyanosis] : no extremity clubbing/cyanosis [No Spinal Tenderness] : no spinal tenderness [No CVA Tenderness] : no CVA  tenderness [Coordination Grossly Intact] : coordination grossly intact [Normal Gait] : normal gait [No Focal Deficits] : no focal deficits [Normal Affect] : the affect was normal [Speech Grossly Normal] : speech grossly normal [Alert and Oriented x3] : oriented to person, place, and time [de-identified] : No sinus tenderness [de-identified] : no stridor [de-identified] : no cords [de-identified] : R=16 [de-identified] : raised red lesions on arms, legs

## 2019-09-26 NOTE — HEALTH RISK ASSESSMENT
[Yes] : Yes [No] : In the past 12 months have you used drugs other than those required for medical reasons? No [No falls in past year] : Patient reported no falls in the past year [0] : 2) Feeling down, depressed, or hopeless: Not at all (0) [de-identified] : derm [] : No [[id number]] : 0 [de-identified] : regular

## 2019-09-26 NOTE — HISTORY OF PRESENT ILLNESS
[FreeTextEntry8] : Comes in for acute medical visit.\par Developed rash.\par Though that it was hives.\par Saw derm. Not hives.\par Biopsy done.\par Very itchy.\par Here because has also noticed a dry cough.\par Has nasal congestion with postnasal drip.\par Throat sore.\par No ear pain.\par Denies chest pain, hemoptysis, fever, SOB/wheezing.\par No body aches or GI symptoms.\par Denies calf pain, edema, orthopnea or PND.

## 2019-09-27 LAB
ALBUMIN SERPL ELPH-MCNC: 4.5 G/DL
ALP BLD-CCNC: 65 U/L
ALT SERPL-CCNC: 17 U/L
ANION GAP SERPL CALC-SCNC: 13 MMOL/L
AST SERPL-CCNC: 16 U/L
B BURGDOR AB SER-IMP: NEGATIVE
B BURGDOR IGG+IGM SER QL IB: NORMAL
B BURGDOR IGM PATRN SER IB-IMP: NEGATIVE
B BURGDOR18/20KD IGM SER QL IB: NORMAL
B BURGDOR18KD IGG SER QL IB: NORMAL
B BURGDOR23KD IGG SER QL IB: NORMAL
B BURGDOR23KD IGM SER QL IB: NORMAL
B BURGDOR28KD AB SER QL IB: NORMAL
B BURGDOR28KD IGG SER QL IB: NORMAL
B BURGDOR30KD AB SER QL IB: NORMAL
B BURGDOR30KD IGG SER QL IB: NORMAL
B BURGDOR31KD IGG SER QL IB: PRESENT
B BURGDOR31KD IGM SER QL IB: NORMAL
B BURGDOR39KD IGG SER QL IB: NORMAL
B BURGDOR39KD IGM SER QL IB: NORMAL
B BURGDOR41KD IGG SER QL IB: PRESENT
B BURGDOR41KD IGM SER QL IB: NORMAL
B BURGDOR45KD AB SER QL IB: NORMAL
B BURGDOR45KD IGG SER QL IB: NORMAL
B BURGDOR58KD AB SER QL IB: NORMAL
B BURGDOR58KD IGG SER QL IB: NORMAL
B BURGDOR66KD IGG SER QL IB: NORMAL
B BURGDOR66KD IGM SER QL IB: NORMAL
B BURGDOR93KD IGG SER QL IB: NORMAL
B BURGDOR93KD IGM SER QL IB: NORMAL
BASOPHILS # BLD AUTO: 0.02 K/UL
BASOPHILS NFR BLD AUTO: 0.4 %
BILIRUB SERPL-MCNC: 0.3 MG/DL
BUN SERPL-MCNC: 19 MG/DL
CALCIUM SERPL-MCNC: 9.2 MG/DL
CHLORIDE SERPL-SCNC: 104 MMOL/L
CO2 SERPL-SCNC: 24 MMOL/L
CREAT SERPL-MCNC: 0.74 MG/DL
EOSINOPHIL # BLD AUTO: 0.05 K/UL
EOSINOPHIL NFR BLD AUTO: 1.1 %
GLUCOSE SERPL-MCNC: 143 MG/DL
HCT VFR BLD CALC: 41.1 %
HGB BLD-MCNC: 12.7 G/DL
IMM GRANULOCYTES NFR BLD AUTO: 0.4 %
LYMPHOCYTES # BLD AUTO: 1.4 K/UL
LYMPHOCYTES NFR BLD AUTO: 31 %
MAN DIFF?: NORMAL
MCHC RBC-ENTMCNC: 30.9 GM/DL
MCHC RBC-ENTMCNC: 31.3 PG
MCV RBC AUTO: 101.2 FL
MONOCYTES # BLD AUTO: 0.41 K/UL
MONOCYTES NFR BLD AUTO: 9.1 %
NEUTROPHILS # BLD AUTO: 2.62 K/UL
NEUTROPHILS NFR BLD AUTO: 58 %
PLATELET # BLD AUTO: 198 K/UL
POTASSIUM SERPL-SCNC: 4.1 MMOL/L
PROT SERPL-MCNC: 6.8 G/DL
RBC # BLD: 4.06 M/UL
RBC # FLD: 13.8 %
SODIUM SERPL-SCNC: 141 MMOL/L
WBC # FLD AUTO: 4.52 K/UL

## 2019-10-01 ENCOUNTER — APPOINTMENT (OUTPATIENT)
Dept: INTERNAL MEDICINE | Facility: CLINIC | Age: 74
End: 2019-10-01
Payer: MEDICARE

## 2019-10-01 ENCOUNTER — NON-APPOINTMENT (OUTPATIENT)
Age: 74
End: 2019-10-01

## 2019-10-01 VITALS
HEART RATE: 80 BPM | OXYGEN SATURATION: 96 % | SYSTOLIC BLOOD PRESSURE: 110 MMHG | TEMPERATURE: 98.1 F | BODY MASS INDEX: 22.62 KG/M2 | HEIGHT: 70.25 IN | WEIGHT: 158 LBS | DIASTOLIC BLOOD PRESSURE: 60 MMHG

## 2019-10-01 PROCEDURE — G0008: CPT

## 2019-10-01 PROCEDURE — G0439: CPT

## 2019-10-01 PROCEDURE — G0444 DEPRESSION SCREEN ANNUAL: CPT | Mod: 59

## 2019-10-01 PROCEDURE — 90662 IIV NO PRSV INCREASED AG IM: CPT

## 2019-10-01 PROCEDURE — 36415 COLL VENOUS BLD VENIPUNCTURE: CPT

## 2019-10-01 PROCEDURE — 93000 ELECTROCARDIOGRAM COMPLETE: CPT | Mod: 59

## 2019-10-01 NOTE — PHYSICAL EXAM
[No Acute Distress] : no acute distress [Well Nourished] : well nourished [Well Developed] : well developed [Well-Appearing] : well-appearing [Normal Voice/Communication] : normal voice/communication [Normal Sclera/Conjunctiva] : normal sclera/conjunctiva [PERRL] : pupils equal round and reactive to light [EOMI] : extraocular movements intact [Normal Outer Ear/Nose] : the outer ears and nose were normal in appearance [Normal Oropharynx] : the oropharynx was normal [Normal TMs] : both tympanic membranes were normal [No JVD] : no jugular venous distention [Supple] : supple [No Lymphadenopathy] : no lymphadenopathy [Clear to Auscultation] : lungs were clear to auscultation bilaterally [No Respiratory Distress] : no respiratory distress  [No Accessory Muscle Use] : no accessory muscle use [Normal Rate] : normal rate  [Regular Rhythm] : with a regular rhythm [Normal S1, S2] : normal S1 and S2 [No Carotid Bruits] : no carotid bruits [Pedal Pulses Present] : the pedal pulses are present [No Extremity Clubbing/Cyanosis] : no extremity clubbing/cyanosis [No Edema] : there was no peripheral edema [Normal Appearance] : normal in appearance [No Nipple Discharge] : no nipple discharge [No Axillary Lymphadenopathy] : no axillary lymphadenopathy [Soft] : abdomen soft [Non Tender] : non-tender [Non-distended] : non-distended [No Masses] : no abdominal mass palpated [No HSM] : no HSM [Normal Bowel Sounds] : normal bowel sounds [Normal Axillary Nodes] : no axillary lymphadenopathy [Normal Supraclavicular Nodes] : no supraclavicular lymphadenopathy [Normal Posterior Cervical Nodes] : no posterior cervical lymphadenopathy [Normal Anterior Cervical Nodes] : no anterior cervical lymphadenopathy [No CVA Tenderness] : no CVA  tenderness [No Spinal Tenderness] : no spinal tenderness [Scoliosis] : scoliosis [Grossly Normal Strength/Tone] : grossly normal strength/tone [No Rash] : no rash [Normal Gait] : normal gait [Coordination Grossly Intact] : coordination grossly intact [Deep Tendon Reflexes (DTR)] : deep tendon reflexes were 2+ and symmetric [No Focal Deficits] : no focal deficits [Speech Grossly Normal] : speech grossly normal [Normal Affect] : the affect was normal [Alert and Oriented x3] : oriented to person, place, and time [de-identified] : No ST [de-identified] : No TM or stridor [de-identified] : R=16 [de-identified] : normal radial pulses; no cords

## 2019-10-01 NOTE — HEALTH RISK ASSESSMENT
[No falls in past year] : Patient reported no falls in the past year [0] : 2) Feeling down, depressed, or hopeless: Not at all (0) [None] : None [Alone] : lives alone [# of Members in Household ___] :  household currently consist of [unfilled] member(s) [Retired] : retired [College] : College [] :  [# Of Children ___] : has [unfilled] children [Feels Safe at Home] : Feels safe at home [Fully functional (bathing, dressing, toileting, transferring, walking, feeding)] : Fully functional (bathing, dressing, toileting, transferring, walking, feeding) [Fully functional (using the telephone, shopping, preparing meals, housekeeping, doing laundry, using] : Fully functional and needs no help or supervision to perform IADLs (using the telephone, shopping, preparing meals, housekeeping, doing laundry, using transportation, managing medications and managing finances) [Smoke Detector] : smoke detector [Carbon Monoxide Detector] : carbon monoxide detector [Seat Belt] :  uses seat belt [Sunscreen] : uses sunscreen [Very Good] : ~his/her~ current health as very good [Good] : ~his/her~  mood as  good [FreeTextEntry1] : none [Intercurrent Urgi Care visits] : went to urgent care [] : No [Yes] : Yes [4 or more  times a week (4 pts)] : 4 or more  times a week (4 points) [1 or 2 (0 pts)] : 1 or 2 (0 points) [Never (0 pts)] : Never (0 points) [No] : In the past 12 months have you used drugs other than those required for medical reasons? No [de-identified] :  ophtho, derm, dentist, GYN [Audit-CScore] : 4 [de-identified] : exercises TIW [de-identified] : regular [[id number]] : 0 [HIV test declined] : HIV test declined [Hepatitis C test declined] : Hepatitis C test declined [Change in mental status noted] : No change in mental status noted [Sexually Active] : not sexually active [Reports changes in hearing] : Reports no changes in hearing [Reports changes in vision] : Reports no changes in vision [Reports changes in dental health] : Reports no changes in dental health [Travel to Developing Areas] : does not  travel to developing areas [Guns at Home] : no guns at home [Caregiver Concerns] : does not have caregiver concerns [TB Exposure] : is not being exposed to tuberculosis [MammogramDate] : 11/18 [PapSmearDate] : 2018 [BoneDensityDate] : 11/18 [ColonoscopyDate] : 02/16 [With Patient/Caregiver] : With Patient/Caregiver [Designated Healthcare Proxy] : Designated healthcare proxy [Name: ___] : Health Care Proxy's Name: [unfilled]  [Relationship: ___] : Relationship: [unfilled] [AdvancecareDate] : 10/19

## 2019-10-01 NOTE — ASSESSMENT
[FreeTextEntry1] : See health maintenance assessment and plan outlined below.\par In addition:\par Full labs and urine sent today\par Bone density done 2018\par Podiatry f/u as needed\par Had colonoscopy 2016\par To see GYN 2019\par To do mammograms 11/2019\par Urology f/u as needed\par Continue meds, diet, exercise as outlined\par EKG done and report d/w patient and scanned\par Cardiology f/u\par CXR declined\par Vaccines d/w patient\par ENT f/u as needed\par To see derm, ophtho, dentist\par RTC for annual CPE and as needed\par To call for any medical issues

## 2019-10-01 NOTE — PAST MEDICAL HISTORY
[Postmenopausal] : postmenopausal [Menarche Age ____] : age at menarche was [unfilled] [Menopause Age____] : age at menopause was [unfilled] [Total Preg ___] : G[unfilled] [Live Births ___] : P[unfilled]  [Living ___] : Living: [unfilled] [Full Term ___] : Full Term: [unfilled] [Multiple Births ___] :  multiple birth pregnancies: [unfilled]
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
## 2019-10-01 NOTE — COUNSELING
[Activity counseling provided] : activity [Healthy eating counseling provided] : healthy eating [Low Fat Diet] : Low fat diet [Walking] : Walking [None] : None [Good understanding] : Patient has a good understanding of lifestyle changes and the steps needed to achieve self management goals

## 2019-10-01 NOTE — REVIEW OF SYSTEMS
[Vision Problems] : vision problems [Hearing Loss] : hearing loss [Anxiety] : anxiety [Negative] : Heme/Lymph

## 2019-10-04 LAB
25(OH)D3 SERPL-MCNC: 41.3 NG/ML
ALBUMIN SERPL ELPH-MCNC: 4.5 G/DL
ALP BLD-CCNC: 61 U/L
ALT SERPL-CCNC: 11 U/L
ANION GAP SERPL CALC-SCNC: 14 MMOL/L
APPEARANCE: CLEAR
AST SERPL-CCNC: 14 U/L
BACTERIA: NEGATIVE
BASOPHILS # BLD AUTO: 0.02 K/UL
BASOPHILS NFR BLD AUTO: 0.5 %
BILIRUB SERPL-MCNC: 0.3 MG/DL
BILIRUBIN URINE: NEGATIVE
BLOOD URINE: NORMAL
BUN SERPL-MCNC: 21 MG/DL
CALCIUM SERPL-MCNC: 9.6 MG/DL
CHLORIDE SERPL-SCNC: 102 MMOL/L
CHOLEST SERPL-MCNC: 187 MG/DL
CHOLEST/HDLC SERPL: 2.5 RATIO
CO2 SERPL-SCNC: 26 MMOL/L
COLOR: YELLOW
CREAT SERPL-MCNC: 0.74 MG/DL
EOSINOPHIL # BLD AUTO: 0.05 K/UL
EOSINOPHIL NFR BLD AUTO: 1.3 %
ESTIMATED AVERAGE GLUCOSE: 120 MG/DL
FOLATE SERPL-MCNC: 17.1 NG/ML
GLUCOSE QUALITATIVE U: NEGATIVE
GLUCOSE SERPL-MCNC: 107 MG/DL
HBA1C MFR BLD HPLC: 5.8 %
HCT VFR BLD CALC: 39.8 %
HDLC SERPL-MCNC: 75 MG/DL
HGB BLD-MCNC: 12.8 G/DL
HYALINE CASTS: 0 /LPF
IMM GRANULOCYTES NFR BLD AUTO: 0.3 %
IRON SERPL-MCNC: 89 UG/DL
KETONES URINE: NEGATIVE
LDLC SERPL CALC-MCNC: 93 MG/DL
LEUKOCYTE ESTERASE URINE: NEGATIVE
LYMPHOCYTES # BLD AUTO: 1.55 K/UL
LYMPHOCYTES NFR BLD AUTO: 41.7 %
MAN DIFF?: NORMAL
MCHC RBC-ENTMCNC: 32.1 PG
MCHC RBC-ENTMCNC: 32.2 GM/DL
MCV RBC AUTO: 99.7 FL
MICROSCOPIC-UA: NORMAL
MONOCYTES # BLD AUTO: 0.28 K/UL
MONOCYTES NFR BLD AUTO: 7.5 %
NEUTROPHILS # BLD AUTO: 1.81 K/UL
NEUTROPHILS NFR BLD AUTO: 48.7 %
NITRITE URINE: NEGATIVE
PH URINE: 6
PLATELET # BLD AUTO: 207 K/UL
POTASSIUM SERPL-SCNC: 4.3 MMOL/L
PROT SERPL-MCNC: 6.7 G/DL
PROTEIN URINE: NEGATIVE
RBC # BLD: 3.99 M/UL
RBC # FLD: 13.6 %
RED BLOOD CELLS URINE: 2 /HPF
SODIUM SERPL-SCNC: 142 MMOL/L
SPECIFIC GRAVITY URINE: 1.02
SQUAMOUS EPITHELIAL CELLS: 1 /HPF
TRIGL SERPL-MCNC: 97 MG/DL
TSH SERPL-ACNC: 1.76 UIU/ML
UROBILINOGEN URINE: NORMAL
VIT B12 SERPL-MCNC: 504 PG/ML
WBC # FLD AUTO: 3.72 K/UL
WHITE BLOOD CELLS URINE: 1 /HPF

## 2019-10-17 ENCOUNTER — MEDICATION RENEWAL (OUTPATIENT)
Age: 74
End: 2019-10-17

## 2019-12-10 ENCOUNTER — FORM ENCOUNTER (OUTPATIENT)
Age: 74
End: 2019-12-10

## 2019-12-11 ENCOUNTER — APPOINTMENT (OUTPATIENT)
Dept: RADIOLOGY | Facility: CLINIC | Age: 74
End: 2019-12-11
Payer: MEDICARE

## 2019-12-11 ENCOUNTER — OUTPATIENT (OUTPATIENT)
Dept: OUTPATIENT SERVICES | Facility: HOSPITAL | Age: 74
LOS: 1 days | End: 2019-12-11
Payer: MEDICARE

## 2019-12-11 ENCOUNTER — APPOINTMENT (OUTPATIENT)
Dept: INTERNAL MEDICINE | Facility: CLINIC | Age: 74
End: 2019-12-11
Payer: MEDICARE

## 2019-12-11 VITALS
WEIGHT: 156.4 LBS | BODY MASS INDEX: 22.28 KG/M2 | SYSTOLIC BLOOD PRESSURE: 144 MMHG | OXYGEN SATURATION: 98 % | TEMPERATURE: 98.1 F | DIASTOLIC BLOOD PRESSURE: 72 MMHG | HEART RATE: 89 BPM

## 2019-12-11 DIAGNOSIS — Z00.8 ENCOUNTER FOR OTHER GENERAL EXAMINATION: ICD-10-CM

## 2019-12-11 DIAGNOSIS — R79.89 OTHER SPECIFIED ABNORMAL FINDINGS OF BLOOD CHEMISTRY: ICD-10-CM

## 2019-12-11 DIAGNOSIS — M89.8X9 OTHER SPECIFIED DISORDERS OF BONE, UNSPECIFIED SITE: ICD-10-CM

## 2019-12-11 PROCEDURE — 72220 X-RAY EXAM SACRUM TAILBONE: CPT | Mod: 26

## 2019-12-11 PROCEDURE — 36415 COLL VENOUS BLD VENIPUNCTURE: CPT

## 2019-12-11 PROCEDURE — 72220 X-RAY EXAM SACRUM TAILBONE: CPT

## 2019-12-11 PROCEDURE — 99213 OFFICE O/P EST LOW 20 MIN: CPT | Mod: 25

## 2019-12-11 NOTE — PHYSICAL EXAM
[No Acute Distress] : no acute distress [Well Nourished] : well nourished [Well Developed] : well developed [Well-Appearing] : well-appearing [Normal Sclera/Conjunctiva] : normal sclera/conjunctiva [Normal Voice/Communication] : normal voice/communication [Normal Outer Ear/Nose] : the outer ears and nose were normal in appearance [No JVD] : no jugular venous distention [Supple] : supple [No Respiratory Distress] : no respiratory distress  [No Accessory Muscle Use] : no accessory muscle use [Normal Rate] : normal rate  [Regular Rhythm] : with a regular rhythm [No Extremity Clubbing/Cyanosis] : no extremity clubbing/cyanosis [No Edema] : there was no peripheral edema [No CVA Tenderness] : no CVA  tenderness [No Rash] : no rash [No Spinal Tenderness] : no spinal tenderness [No Focal Deficits] : no focal deficits [Coordination Grossly Intact] : coordination grossly intact [Normal Gait] : normal gait [Normal Affect] : the affect was normal [Speech Grossly Normal] : speech grossly normal [de-identified] : no stridor [Alert and Oriented x3] : oriented to person, place, and time [de-identified] : R=16 [de-identified] : prominence likely base of sacrum/coccyx

## 2019-12-11 NOTE — HISTORY OF PRESENT ILLNESS
[FreeTextEntry8] : Rissa comes in for an acute medical visit.\par She is concerned because she feels a prominence in her sacral area posteriorly.\par She is concerned that she has a mass.\par She also is here for followup blood work to check her hemoglobin A1c and CBC.\par She denies any back pain. She denies any injury or trauma.\par She has been dieting and exercising with weight control.

## 2019-12-11 NOTE — ASSESSMENT
[FreeTextEntry1] : Bony prominence = suspect base of sacrum/coccyx\par Will do film of sacrum/coccyx to confirm and exclude any bony lesion or fracture\par Reassured\par \par Abnormal CBC\par Will do repeat today\par May need hematology f/u if WBC remains low\par \par Prediabetes\par Check HGBA1C today\par Exercise\par Weight control ADA diet\par \par RTC for physical and as needed\par To call for any medical issues\par \par

## 2019-12-11 NOTE — HEALTH RISK ASSESSMENT
[] : No [Yes] : Yes [No] : In the past 12 months have you used drugs other than those required for medical reasons? No [No falls in past year] : Patient reported no falls in the past year [0] : 2) Feeling down, depressed, or hopeless: Not at all (0) [de-identified] : exercises [de-identified] : regular

## 2019-12-13 LAB
BASOPHILS # BLD AUTO: 0.01 K/UL
BASOPHILS NFR BLD AUTO: 0.2 %
EOSINOPHIL # BLD AUTO: 0.03 K/UL
EOSINOPHIL NFR BLD AUTO: 0.7 %
ESTIMATED AVERAGE GLUCOSE: 120 MG/DL
HBA1C MFR BLD HPLC: 5.8 %
HCT VFR BLD CALC: 39.5 %
HGB BLD-MCNC: 12.8 G/DL
IMM GRANULOCYTES NFR BLD AUTO: 0.2 %
LYMPHOCYTES # BLD AUTO: 1.46 K/UL
LYMPHOCYTES NFR BLD AUTO: 35.3 %
MAN DIFF?: NORMAL
MCHC RBC-ENTMCNC: 31.6 PG
MCHC RBC-ENTMCNC: 32.4 GM/DL
MCV RBC AUTO: 97.5 FL
MONOCYTES # BLD AUTO: 0.36 K/UL
MONOCYTES NFR BLD AUTO: 8.7 %
NEUTROPHILS # BLD AUTO: 2.27 K/UL
NEUTROPHILS NFR BLD AUTO: 54.9 %
PLATELET # BLD AUTO: 222 K/UL
RBC # BLD: 4.05 M/UL
RBC # FLD: 13.2 %
WBC # FLD AUTO: 4.14 K/UL

## 2020-05-11 NOTE — ED PROVIDER NOTE - PR
Spoke to mom due to COVID-19 as of right now were are not able to due physicals for over the age of 3 . Due to pt medical needs offered mom a TL so that Dr. Banks can touch base. Mom agreed ,and set up TL call .    142

## 2020-08-29 ENCOUNTER — TRANSCRIPTION ENCOUNTER (OUTPATIENT)
Age: 75
End: 2020-08-29

## 2020-09-22 ENCOUNTER — APPOINTMENT (OUTPATIENT)
Dept: INTERNAL MEDICINE | Facility: CLINIC | Age: 75
End: 2020-09-22
Payer: MEDICARE

## 2020-09-22 PROCEDURE — G0008: CPT

## 2020-09-22 PROCEDURE — 90662 IIV NO PRSV INCREASED AG IM: CPT

## 2020-09-23 ENCOUNTER — APPOINTMENT (OUTPATIENT)
Dept: ORTHOPEDIC SURGERY | Facility: CLINIC | Age: 75
End: 2020-09-23
Payer: MEDICARE

## 2020-09-23 VITALS
SYSTOLIC BLOOD PRESSURE: 134 MMHG | DIASTOLIC BLOOD PRESSURE: 73 MMHG | HEIGHT: 70 IN | WEIGHT: 150 LBS | BODY MASS INDEX: 21.47 KG/M2 | HEART RATE: 78 BPM

## 2020-09-23 DIAGNOSIS — Z78.9 OTHER SPECIFIED HEALTH STATUS: ICD-10-CM

## 2020-09-23 DIAGNOSIS — M72.0 PALMAR FASCIAL FIBROMATOSIS [DUPUYTREN]: ICD-10-CM

## 2020-09-23 PROCEDURE — 99202 OFFICE O/P NEW SF 15 MIN: CPT

## 2020-10-13 ENCOUNTER — APPOINTMENT (OUTPATIENT)
Dept: INTERNAL MEDICINE | Facility: CLINIC | Age: 75
End: 2020-10-13
Payer: MEDICARE

## 2020-10-13 ENCOUNTER — NON-APPOINTMENT (OUTPATIENT)
Age: 75
End: 2020-10-13

## 2020-10-13 VITALS
OXYGEN SATURATION: 98 % | HEART RATE: 87 BPM | SYSTOLIC BLOOD PRESSURE: 140 MMHG | WEIGHT: 146.9 LBS | TEMPERATURE: 97.5 F | DIASTOLIC BLOOD PRESSURE: 70 MMHG | HEIGHT: 69 IN | BODY MASS INDEX: 21.76 KG/M2

## 2020-10-13 PROCEDURE — G0444 DEPRESSION SCREEN ANNUAL: CPT | Mod: 59

## 2020-10-13 PROCEDURE — 93000 ELECTROCARDIOGRAM COMPLETE: CPT | Mod: 59

## 2020-10-13 PROCEDURE — 36415 COLL VENOUS BLD VENIPUNCTURE: CPT

## 2020-10-13 PROCEDURE — G0439: CPT

## 2020-10-13 NOTE — ASSESSMENT
[FreeTextEntry1] : See health maintenance assessment and plan outlined below.\par In addition:\par Full labs and urine sent today\par Bone density done 2018 = to do 2020\par Podiatry f/u as needed\par Ortho f/u\par Had colonoscopy 2016 - seeing GI Dr. Morfin 2020\par Saw GYN 2020\par To do mammograms 11/2020\par Urology f/u as needed\par Continue meds, diet, exercise as outlined\par EKG done and report d/w patient and scanned\par Cardiology f/u\par CXR declined\par Vaccines d/w patient\par She declined thyroid ultrasound\par ENT f/u to clear ear cerumen\par To see derm, ophtho, dentist\par RTC for annual CPE and as needed\par To call for any medical issues

## 2020-10-13 NOTE — PHYSICAL EXAM
[No Acute Distress] : no acute distress [Well Nourished] : well nourished [Well Developed] : well developed [Well-Appearing] : well-appearing [Normal Voice/Communication] : normal voice/communication [Normal Sclera/Conjunctiva] : normal sclera/conjunctiva [PERRL] : pupils equal round and reactive to light [EOMI] : extraocular movements intact [Normal Outer Ear/Nose] : the outer ears and nose were normal in appearance [Normal TMs] : both tympanic membranes were normal [No JVD] : no jugular venous distention [Supple] : supple [No Lymphadenopathy] : no lymphadenopathy [No Respiratory Distress] : no respiratory distress  [Clear to Auscultation] : lungs were clear to auscultation bilaterally [No Accessory Muscle Use] : no accessory muscle use [Normal Rate] : normal rate  [Regular Rhythm] : with a regular rhythm [Normal S1, S2] : normal S1 and S2 [No Carotid Bruits] : no carotid bruits [Pedal Pulses Present] : the pedal pulses are present [No Edema] : there was no peripheral edema [No Extremity Clubbing/Cyanosis] : no extremity clubbing/cyanosis [Normal Appearance] : normal in appearance [No Nipple Discharge] : no nipple discharge [No Axillary Lymphadenopathy] : no axillary lymphadenopathy [Soft] : abdomen soft [Non Tender] : non-tender [Non-distended] : non-distended [No Masses] : no abdominal mass palpated [No HSM] : no HSM [Normal Bowel Sounds] : normal bowel sounds [Normal Supraclavicular Nodes] : no supraclavicular lymphadenopathy [Normal Axillary Nodes] : no axillary lymphadenopathy [Normal Posterior Cervical Nodes] : no posterior cervical lymphadenopathy [Normal Anterior Cervical Nodes] : no anterior cervical lymphadenopathy [No CVA Tenderness] : no CVA  tenderness [No Spinal Tenderness] : no spinal tenderness [Scoliosis] : scoliosis [Grossly Normal Strength/Tone] : grossly normal strength/tone [No Rash] : no rash [Normal Gait] : normal gait [Coordination Grossly Intact] : coordination grossly intact [No Focal Deficits] : no focal deficits [Deep Tendon Reflexes (DTR)] : deep tendon reflexes were 2+ and symmetric [Speech Grossly Normal] : speech grossly normal [Normal Affect] : the affect was normal [Alert and Oriented x3] : oriented to person, place, and time [Declined Rectal Exam] : declined rectal exam [de-identified] : No ST; wearing full facemask; right TM partially blocked with ear cerumen [de-identified] : No TM or stridor [de-identified] : R=16 [de-identified] : normal radial pulses; no cords [de-identified] : As per GYN [de-identified] : Tanned

## 2020-10-13 NOTE — HEALTH RISK ASSESSMENT
[Good] : ~his/her~  mood as  good [Intercurrent Urgi Care visits] : went to urgent care [Yes] : Yes [4 or more  times a week (4 pts)] : 4 or more  times a week (4 points) [1 or 2 (0 pts)] : 1 or 2 (0 points) [Never (0 pts)] : Never (0 points) [No] : In the past 12 months have you used drugs other than those required for medical reasons? No [No falls in past year] : Patient reported no falls in the past year [0] : 2) Feeling down, depressed, or hopeless: Not at all (0) [HIV test declined] : HIV test declined [Hepatitis C test declined] : Hepatitis C test declined [None] : None [Alone] : lives alone [# of Members in Household ___] :  household currently consist of [unfilled] member(s) [Retired] : retired [College] : College [] :  [# Of Children ___] : has [unfilled] children [Feels Safe at Home] : Feels safe at home [Fully functional (bathing, dressing, toileting, transferring, walking, feeding)] : Fully functional (bathing, dressing, toileting, transferring, walking, feeding) [Fully functional (using the telephone, shopping, preparing meals, housekeeping, doing laundry, using] : Fully functional and needs no help or supervision to perform IADLs (using the telephone, shopping, preparing meals, housekeeping, doing laundry, using transportation, managing medications and managing finances) [Smoke Detector] : smoke detector [Carbon Monoxide Detector] : carbon monoxide detector [Seat Belt] :  uses seat belt [Sunscreen] : uses sunscreen [With Patient/Caregiver] : With Patient/Caregiver [Designated Healthcare Proxy] : Designated healthcare proxy [Name: ___] : Health Care Proxy's Name: [unfilled]  [Relationship: ___] : Relationship: [unfilled] [FreeTextEntry1] : insomnia, stress [] : No [de-identified] :  ophtho, derm, dentist, GYN, ortho [Audit-CScore] : 4 [de-identified] : exercises TIW [de-identified] : regular [[id number]] : 0 [Change in mental status noted] : No change in mental status noted [Sexually Active] : not sexually active [Reports changes in hearing] : Reports no changes in hearing [Reports changes in vision] : Reports no changes in vision [Reports changes in dental health] : Reports no changes in dental health [Guns at Home] : no guns at home [Travel to Developing Areas] : does not  travel to developing areas [TB Exposure] : is not being exposed to tuberculosis [Caregiver Concerns] : does not have caregiver concerns [MammogramDate] : 11/19 [PapSmearDate] : 2020 [BoneDensityDate] : 11/18 [ColonoscopyDate] : 02/16 [AdvancecareDate] : 10/20

## 2020-10-14 ENCOUNTER — TRANSCRIPTION ENCOUNTER (OUTPATIENT)
Age: 75
End: 2020-10-14

## 2020-10-16 LAB
25(OH)D3 SERPL-MCNC: 51.6 NG/ML
ALBUMIN SERPL ELPH-MCNC: 4.8 G/DL
ALP BLD-CCNC: 73 U/L
ALT SERPL-CCNC: 10 U/L
ANION GAP SERPL CALC-SCNC: 13 MMOL/L
APPEARANCE: CLEAR
AST SERPL-CCNC: 14 U/L
BACTERIA: NEGATIVE
BASOPHILS # BLD AUTO: 0.02 K/UL
BASOPHILS NFR BLD AUTO: 0.5 %
BILIRUB SERPL-MCNC: 0.4 MG/DL
BILIRUBIN URINE: NEGATIVE
BLOOD URINE: NORMAL
BUN SERPL-MCNC: 16 MG/DL
CALCIUM SERPL-MCNC: 9.6 MG/DL
CHLORIDE SERPL-SCNC: 101 MMOL/L
CHOLEST SERPL-MCNC: 183 MG/DL
CHOLEST/HDLC SERPL: 2.3 RATIO
CO2 SERPL-SCNC: 27 MMOL/L
COLOR: NORMAL
CREAT SERPL-MCNC: 0.72 MG/DL
EOSINOPHIL # BLD AUTO: 0.01 K/UL
EOSINOPHIL NFR BLD AUTO: 0.3 %
ESTIMATED AVERAGE GLUCOSE: 123 MG/DL
FOLATE SERPL-MCNC: 19.2 NG/ML
GLUCOSE QUALITATIVE U: NEGATIVE
GLUCOSE SERPL-MCNC: 102 MG/DL
HBA1C MFR BLD HPLC: 5.9 %
HCT VFR BLD CALC: 41.5 %
HDLC SERPL-MCNC: 81 MG/DL
HGB BLD-MCNC: 13.2 G/DL
HYALINE CASTS: 0 /LPF
IMM GRANULOCYTES NFR BLD AUTO: 0.3 %
IRON SERPL-MCNC: 112 UG/DL
KETONES URINE: NEGATIVE
LDLC SERPL CALC-MCNC: 85 MG/DL
LEUKOCYTE ESTERASE URINE: NEGATIVE
LYMPHOCYTES # BLD AUTO: 1.55 K/UL
LYMPHOCYTES NFR BLD AUTO: 39.3 %
MAN DIFF?: NORMAL
MCHC RBC-ENTMCNC: 31.7 PG
MCHC RBC-ENTMCNC: 31.8 GM/DL
MCV RBC AUTO: 99.8 FL
MICROSCOPIC-UA: NORMAL
MONOCYTES # BLD AUTO: 0.33 K/UL
MONOCYTES NFR BLD AUTO: 8.4 %
NEUTROPHILS # BLD AUTO: 2.02 K/UL
NEUTROPHILS NFR BLD AUTO: 51.2 %
NITRITE URINE: NEGATIVE
PH URINE: 6.5
PLATELET # BLD AUTO: 213 K/UL
POTASSIUM SERPL-SCNC: 5.1 MMOL/L
PROT SERPL-MCNC: 7 G/DL
PROTEIN URINE: NEGATIVE
RBC # BLD: 4.16 M/UL
RBC # FLD: 13.2 %
RED BLOOD CELLS URINE: 2 /HPF
SARS-COV-2 IGG SERPL IA-ACNC: 0.09 INDEX
SARS-COV-2 IGG SERPL QL IA: NEGATIVE
SODIUM SERPL-SCNC: 140 MMOL/L
SPECIFIC GRAVITY URINE: 1.02
SQUAMOUS EPITHELIAL CELLS: 1 /HPF
TRIGL SERPL-MCNC: 84 MG/DL
TSH SERPL-ACNC: 1.52 UIU/ML
UROBILINOGEN URINE: NORMAL
VIT B12 SERPL-MCNC: 420 PG/ML
WBC # FLD AUTO: 3.94 K/UL
WHITE BLOOD CELLS URINE: 0 /HPF

## 2020-10-22 ENCOUNTER — RX RENEWAL (OUTPATIENT)
Age: 75
End: 2020-10-22

## 2020-11-21 ENCOUNTER — TRANSCRIPTION ENCOUNTER (OUTPATIENT)
Age: 75
End: 2020-11-21

## 2020-11-24 ENCOUNTER — NON-APPOINTMENT (OUTPATIENT)
Age: 75
End: 2020-11-24

## 2020-12-14 ENCOUNTER — TRANSCRIPTION ENCOUNTER (OUTPATIENT)
Age: 75
End: 2020-12-14

## 2020-12-20 ENCOUNTER — TRANSCRIPTION ENCOUNTER (OUTPATIENT)
Age: 75
End: 2020-12-20

## 2021-02-16 ENCOUNTER — TRANSCRIPTION ENCOUNTER (OUTPATIENT)
Age: 76
End: 2021-02-16

## 2021-02-18 NOTE — ED PROVIDER NOTE - NEURO NEGATIVE STATEMENT, MLM
[facility] follow up. Patient on with  [facility] ED visit for Flank Pain / Left Ureteral Stone with D/C 21 . Contacted patient for Health Promotion and Risk Prevention services. Verified  and Zipcode for HIPAA security. *Spoke with patient who reports that he is doing well. Patient states that he was able to pass Kidney Stone. Denies pain - rates as 0 on 0-10 pain scale. Denies burning, voiding without difficulty. No blood noted in urine. Patient states he is drinking plenty of water to hopefully avoid getting another one. *Patient did not follow up with his PCP since he passed stone. Patients pain will be controlled at 5/10 or less with pain medication and/or non-pharmacologic methods. ? 21 -  Rates pain as 3 on 0-10 pain scale  ? 21 - Rates pain as 0 on 0-10 pain scale     Demonstrates no signs of Complications or Red Flags in 30 day  Reviewed and discussed importance of monitoring and reporting Red Flags  § 21 - Denies s/s of infection or Red Flag Symptoms  § 21 - Denies s/s of Infection or Red Flag Symptoms                 Completes all Follow-Up appointments within 30 days of Discharge  Educated on importance of Follow Up for prevention of complications  ? 21 - States he will make follow up appointment with PCP. ? 21 - Patient did not follow up with his PCP since he passed stone    Patient states that he is doing well enough that additional support is not needed at this time. Patient had no other questions or concerns. Contact information provided and reminded him that I can be reached if any needs arise in the future. Resolving current episode 31(Transitions of care complete). No further ED/UC or hospital admissions [id number] days post discharge. Patient attended follow-up appointments as directed. No outreach from patient to [address] [place] [address].
no loss of consciousness, no gait abnormality, no headache, no sensory deficits, and no weakness.

## 2021-05-05 ENCOUNTER — RESULT REVIEW (OUTPATIENT)
Age: 76
End: 2021-05-05

## 2021-05-05 ENCOUNTER — APPOINTMENT (OUTPATIENT)
Dept: MAMMOGRAPHY | Facility: CLINIC | Age: 76
End: 2021-05-05
Payer: MEDICARE

## 2021-05-05 PROCEDURE — 77063 BREAST TOMOSYNTHESIS BI: CPT

## 2021-05-05 PROCEDURE — 77067 SCR MAMMO BI INCL CAD: CPT

## 2021-05-27 ENCOUNTER — RX RENEWAL (OUTPATIENT)
Age: 76
End: 2021-05-27

## 2021-09-09 ENCOUNTER — TRANSCRIPTION ENCOUNTER (OUTPATIENT)
Age: 76
End: 2021-09-09

## 2021-09-09 ENCOUNTER — NON-APPOINTMENT (OUTPATIENT)
Age: 76
End: 2021-09-09

## 2021-09-09 DIAGNOSIS — Z20.822 CONTACT WITH AND (SUSPECTED) EXPOSURE TO COVID-19: ICD-10-CM

## 2021-09-13 ENCOUNTER — APPOINTMENT (OUTPATIENT)
Dept: INTERNAL MEDICINE | Facility: CLINIC | Age: 76
End: 2021-09-13
Payer: MEDICARE

## 2021-09-13 PROCEDURE — G0008: CPT

## 2021-09-13 PROCEDURE — 90662 IIV NO PRSV INCREASED AG IM: CPT

## 2021-09-14 LAB
COVID-19 NUCLEOCAPSID  GAM ANTIBODY INTERPRETATION: NEGATIVE
COVID-19 SPIKE DOMAIN ANTIBODY INTERPRETATION: POSITIVE
SARS-COV-2 AB SERPL IA-ACNC: >250 U/ML
SARS-COV-2 AB SERPL QL IA: 0.12 INDEX

## 2021-09-20 ENCOUNTER — TRANSCRIPTION ENCOUNTER (OUTPATIENT)
Age: 76
End: 2021-09-20

## 2021-10-19 ENCOUNTER — APPOINTMENT (OUTPATIENT)
Dept: INTERNAL MEDICINE | Facility: CLINIC | Age: 76
End: 2021-10-19
Payer: MEDICARE

## 2021-10-19 ENCOUNTER — NON-APPOINTMENT (OUTPATIENT)
Age: 76
End: 2021-10-19

## 2021-10-19 VITALS
SYSTOLIC BLOOD PRESSURE: 120 MMHG | TEMPERATURE: 97.8 F | HEIGHT: 69.5 IN | WEIGHT: 156 LBS | DIASTOLIC BLOOD PRESSURE: 70 MMHG | BODY MASS INDEX: 22.59 KG/M2 | OXYGEN SATURATION: 99 % | HEART RATE: 88 BPM

## 2021-10-19 PROCEDURE — G0439: CPT

## 2021-10-19 PROCEDURE — 93000 ELECTROCARDIOGRAM COMPLETE: CPT | Mod: 59

## 2021-10-19 PROCEDURE — G0444 DEPRESSION SCREEN ANNUAL: CPT

## 2021-10-19 RX ORDER — ACETAMINOPHEN 325 MG
5000 TABLET ORAL
Refills: 0 | Status: DISCONTINUED | COMMUNITY
End: 2021-10-19

## 2021-10-19 NOTE — PHYSICAL EXAM
[No Acute Distress] : no acute distress [Well Nourished] : well nourished [Well Developed] : well developed [Well-Appearing] : well-appearing [Normal Voice/Communication] : normal voice/communication [Normal Sclera/Conjunctiva] : normal sclera/conjunctiva [PERRL] : pupils equal round and reactive to light [EOMI] : extraocular movements intact [Normal Outer Ear/Nose] : the outer ears and nose were normal in appearance [Normal TMs] : both tympanic membranes were normal [No JVD] : no jugular venous distention [Supple] : supple [No Lymphadenopathy] : no lymphadenopathy [No Respiratory Distress] : no respiratory distress  [Clear to Auscultation] : lungs were clear to auscultation bilaterally [No Accessory Muscle Use] : no accessory muscle use [Normal Rate] : normal rate  [Regular Rhythm] : with a regular rhythm [Normal S1, S2] : normal S1 and S2 [No Carotid Bruits] : no carotid bruits [Pedal Pulses Present] : the pedal pulses are present [No Edema] : there was no peripheral edema [No Extremity Clubbing/Cyanosis] : no extremity clubbing/cyanosis [Normal Appearance] : normal in appearance [No Nipple Discharge] : no nipple discharge [No Axillary Lymphadenopathy] : no axillary lymphadenopathy [Soft] : abdomen soft [Non Tender] : non-tender [Non-distended] : non-distended [No Masses] : no abdominal mass palpated [No HSM] : no HSM [Normal Bowel Sounds] : normal bowel sounds [Declined Rectal Exam] : declined rectal exam [Normal Supraclavicular Nodes] : no supraclavicular lymphadenopathy [Normal Axillary Nodes] : no axillary lymphadenopathy [Normal Posterior Cervical Nodes] : no posterior cervical lymphadenopathy [Normal Anterior Cervical Nodes] : no anterior cervical lymphadenopathy [No CVA Tenderness] : no CVA  tenderness [No Spinal Tenderness] : no spinal tenderness [Scoliosis] : scoliosis [Grossly Normal Strength/Tone] : grossly normal strength/tone [No Rash] : no rash [Normal Gait] : normal gait [Coordination Grossly Intact] : coordination grossly intact [No Focal Deficits] : no focal deficits [Speech Grossly Normal] : speech grossly normal [Normal Affect] : the affect was normal [Alert and Oriented x3] : oriented to person, place, and time [de-identified] : No ST; wearing full facemask [de-identified] : No TM or stridor [de-identified] : R=16 [de-identified] : normal radial pulses; no cords [de-identified] : As per GYN

## 2021-10-19 NOTE — HISTORY OF PRESENT ILLNESS
[FreeTextEntry1] : Comes in for annual physical. [de-identified] : Feeling well.\par Denies covid illness.\par Had vaccination.

## 2021-10-19 NOTE — HEALTH RISK ASSESSMENT
[Good] : ~his/her~  mood as  good [Yes] : Yes [4 or more  times a week (4 pts)] : 4 or more  times a week (4 points) [1 or 2 (0 pts)] : 1 or 2 (0 points) [Never (0 pts)] : Never (0 points) [No] : In the past 12 months have you used drugs other than those required for medical reasons? No [No falls in past year] : Patient reported no falls in the past year [0] : 2) Feeling down, depressed, or hopeless: Not at all (0) [HIV test declined] : HIV test declined [Hepatitis C test declined] : Hepatitis C test declined [None] : None [Alone] : lives alone [# of Members in Household ___] :  household currently consist of [unfilled] member(s) [Retired] : retired [College] : College [] :  [# Of Children ___] : has [unfilled] children [Feels Safe at Home] : Feels safe at home [Fully functional (bathing, dressing, toileting, transferring, walking, feeding)] : Fully functional (bathing, dressing, toileting, transferring, walking, feeding) [Fully functional (using the telephone, shopping, preparing meals, housekeeping, doing laundry, using] : Fully functional and needs no help or supervision to perform IADLs (using the telephone, shopping, preparing meals, housekeeping, doing laundry, using transportation, managing medications and managing finances) [Smoke Detector] : smoke detector [Carbon Monoxide Detector] : carbon monoxide detector [Seat Belt] :  uses seat belt [Sunscreen] : uses sunscreen [Intercurrent Urgi Care visits] : went to urgent care [PHQ-2 Negative - No further assessment needed] : PHQ-2 Negative - No further assessment needed [With Patient/Caregiver] : , with patient/caregiver [Designated Healthcare Proxy] : Designated healthcare proxy [Name: ___] : Health Care Proxy's Name: [unfilled]  [Relationship: ___] : Relationship: [unfilled] [] : No [de-identified] : covid testing [de-identified] :  ophtho, derm, dentist, GYN, ENT [Audit-CScore] : 4 [de-identified] : exercises [de-identified] : regular [[id number]] : 0 [Change in mental status noted] : No change in mental status noted [Reports changes in hearing] : Reports no changes in hearing [Reports changes in vision] : Reports no changes in vision [Reports changes in dental health] : Reports no changes in dental health [Guns at Home] : no guns at home [Travel to Developing Areas] : does not  travel to developing areas [TB Exposure] : is not being exposed to tuberculosis [Caregiver Concerns] : does not have caregiver concerns [MammogramDate] : 05/21 [PapSmearDate] : 2021 [BoneDensityDate] : 04/21 [ColonoscopyDate] : 02/16 [AdvancecareDate] : 10/21

## 2021-10-19 NOTE — ASSESSMENT
[FreeTextEntry1] : See health maintenance assessment and plan outlined below.\par In addition:\par Full labs and urine testing done today\par Bone density done 4/2021\par Podiatry f/u as needed\par Ortho f/u as needed\par Had colonoscopy 2016 - seeing GI Dr. Morfin 2021 for repeat study\par Saw GYN 2021\par Had mammograms 2021\par Urology f/u as needed\par Continue meds, diet, exercise as outlined\par EKG done and report d/w patient and scanned\par Cardiology f/u 2021\par CXR declined\par Vaccines d/w patient\par She declined thyroid ultrasound\par To see derm, ophtho, dentist 2021\par RTC for annual CPE and as needed\par To call for any medical issues\par All of the above was discussed in detail with her and all of her questions were answered\par She verbally confirmed understanding of all of the above and agreement with the above plan

## 2021-10-22 LAB
25(OH)D3 SERPL-MCNC: 52.4 NG/ML
ABO + RH PNL BLD: NORMAL
ALBUMIN SERPL ELPH-MCNC: 4.7 G/DL
ALP BLD-CCNC: 68 U/L
ALT SERPL-CCNC: 14 U/L
ANION GAP SERPL CALC-SCNC: 13 MMOL/L
AST SERPL-CCNC: 15 U/L
BASOPHILS # BLD AUTO: 0.03 K/UL
BASOPHILS NFR BLD AUTO: 0.6 %
BILIRUB SERPL-MCNC: 0.4 MG/DL
BUN SERPL-MCNC: 15 MG/DL
CALCIUM SERPL-MCNC: 9.7 MG/DL
CHLORIDE SERPL-SCNC: 103 MMOL/L
CHOLEST SERPL-MCNC: 190 MG/DL
CO2 SERPL-SCNC: 25 MMOL/L
COVID-19 NUCLEOCAPSID  GAM ANTIBODY INTERPRETATION: NEGATIVE
COVID-19 SPIKE DOMAIN ANTIBODY INTERPRETATION: POSITIVE
CREAT SERPL-MCNC: 0.72 MG/DL
EOSINOPHIL # BLD AUTO: 0.03 K/UL
EOSINOPHIL NFR BLD AUTO: 0.6 %
ESTIMATED AVERAGE GLUCOSE: 120 MG/DL
FOLATE SERPL-MCNC: >20 NG/ML
GLUCOSE SERPL-MCNC: 114 MG/DL
HBA1C MFR BLD HPLC: 5.8 %
HCT VFR BLD CALC: 41.9 %
HDLC SERPL-MCNC: 90 MG/DL
HGB BLD-MCNC: 13.4 G/DL
IMM GRANULOCYTES NFR BLD AUTO: 0.2 %
IRON SERPL-MCNC: 86 UG/DL
LDLC SERPL CALC-MCNC: 84 MG/DL
LYMPHOCYTES # BLD AUTO: 1.69 K/UL
LYMPHOCYTES NFR BLD AUTO: 35.9 %
MAN DIFF?: NORMAL
MCHC RBC-ENTMCNC: 32 GM/DL
MCHC RBC-ENTMCNC: 32 PG
MCV RBC AUTO: 100 FL
MONOCYTES # BLD AUTO: 0.42 K/UL
MONOCYTES NFR BLD AUTO: 8.9 %
NEUTROPHILS # BLD AUTO: 2.53 K/UL
NEUTROPHILS NFR BLD AUTO: 53.8 %
NONHDLC SERPL-MCNC: 100 MG/DL
PLATELET # BLD AUTO: 228 K/UL
POTASSIUM SERPL-SCNC: 4.7 MMOL/L
PROT SERPL-MCNC: 7.3 G/DL
RBC # BLD: 4.19 M/UL
RBC # FLD: 14.2 %
SARS-COV-2 AB SERPL IA-ACNC: >250 U/ML
SARS-COV-2 AB SERPL QL IA: 0.1 INDEX
SODIUM SERPL-SCNC: 141 MMOL/L
TRIGL SERPL-MCNC: 79 MG/DL
TSH SERPL-ACNC: 1.77 UIU/ML
VIT B12 SERPL-MCNC: 436 PG/ML
WBC # FLD AUTO: 4.71 K/UL

## 2021-11-18 ENCOUNTER — TRANSCRIPTION ENCOUNTER (OUTPATIENT)
Age: 76
End: 2021-11-18

## 2022-06-15 ENCOUNTER — RX RENEWAL (OUTPATIENT)
Age: 77
End: 2022-06-15
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
## 2022-08-08 ENCOUNTER — RX RENEWAL (OUTPATIENT)
Age: 77
End: 2022-08-08

## 2022-08-23 ENCOUNTER — TRANSCRIPTION ENCOUNTER (OUTPATIENT)
Age: 77
End: 2022-08-23

## 2022-08-24 ENCOUNTER — TRANSCRIPTION ENCOUNTER (OUTPATIENT)
Age: 77
End: 2022-08-24

## 2022-09-08 ENCOUNTER — TRANSCRIPTION ENCOUNTER (OUTPATIENT)
Age: 77
End: 2022-09-08

## 2022-09-09 ENCOUNTER — NON-APPOINTMENT (OUTPATIENT)
Age: 77
End: 2022-09-09

## 2022-09-26 ENCOUNTER — TRANSCRIPTION ENCOUNTER (OUTPATIENT)
Age: 77
End: 2022-09-26

## 2022-11-01 ENCOUNTER — NON-APPOINTMENT (OUTPATIENT)
Age: 77
End: 2022-11-01

## 2022-11-01 ENCOUNTER — APPOINTMENT (OUTPATIENT)
Dept: INTERNAL MEDICINE | Facility: CLINIC | Age: 77
End: 2022-11-01

## 2022-11-01 VITALS
DIASTOLIC BLOOD PRESSURE: 70 MMHG | OXYGEN SATURATION: 99 % | SYSTOLIC BLOOD PRESSURE: 124 MMHG | BODY MASS INDEX: 22.36 KG/M2 | HEART RATE: 64 BPM | HEIGHT: 69 IN | WEIGHT: 151 LBS | TEMPERATURE: 97.3 F

## 2022-11-01 DIAGNOSIS — Z80.7 FAMILY HISTORY OF OTHER MALIGNANT NEOPLASMS OF LYMPHOID, HEMATOPOIETIC AND RELATED TISSUES: ICD-10-CM

## 2022-11-01 DIAGNOSIS — Z23 ENCOUNTER FOR IMMUNIZATION: ICD-10-CM

## 2022-11-01 PROCEDURE — 93000 ELECTROCARDIOGRAM COMPLETE: CPT | Mod: 59

## 2022-11-01 PROCEDURE — G0439: CPT

## 2022-11-01 PROCEDURE — 90662 IIV NO PRSV INCREASED AG IM: CPT

## 2022-11-01 PROCEDURE — G0008: CPT

## 2022-11-01 PROCEDURE — G0444 DEPRESSION SCREEN ANNUAL: CPT

## 2022-11-01 RX ORDER — MINOCYCLINE HYDROCHLORIDE 100 MG/1
100 CAPSULE ORAL
Qty: 7 | Refills: 0 | Status: COMPLETED | COMMUNITY
Start: 2022-08-22

## 2022-11-01 RX ORDER — ONDANSETRON 4 MG/1
4 TABLET ORAL
Qty: 30 | Refills: 0 | Status: ACTIVE | COMMUNITY
Start: 2020-10-13 | End: 1900-01-01

## 2022-11-01 NOTE — HEALTH RISK ASSESSMENT
[Good] : ~his/her~ current health as good [Fair] :  ~his/her~ mood as fair [Intercurrent Urgi Care visits] : went to urgent care [Never] : Never [Yes] : Yes [4 or more  times a week (4 pts)] : 4 or more  times a week (4 points) [1 or 2 (0 pts)] : 1 or 2 (0 points) [Never (0 pts)] : Never (0 points) [No] : In the past 12 months have you used drugs other than those required for medical reasons? No [No falls in past year] : Patient reported no falls in the past year [HIV test declined] : HIV test declined [Hepatitis C test declined] : Hepatitis C test declined [None] : None [Alone] : lives alone [# of Members in Household ___] :  household currently consist of [unfilled] member(s) [Retired] : retired [College] : College [] :  [# Of Children ___] : has [unfilled] children [Feels Safe at Home] : Feels safe at home [Fully functional (bathing, dressing, toileting, transferring, walking, feeding)] : Fully functional (bathing, dressing, toileting, transferring, walking, feeding) [Fully functional (using the telephone, shopping, preparing meals, housekeeping, doing laundry, using] : Fully functional and needs no help or supervision to perform IADLs (using the telephone, shopping, preparing meals, housekeeping, doing laundry, using transportation, managing medications and managing finances) [Reports changes in vision] : Reports changes in vision [Smoke Detector] : smoke detector [Carbon Monoxide Detector] : carbon monoxide detector [Seat Belt] :  uses seat belt [Sunscreen] : uses sunscreen [With Patient/Caregiver] : , with patient/caregiver [Designated Healthcare Proxy] : Designated healthcare proxy [Name: ___] : Health Care Proxy's Name: [unfilled]  [Relationship: ___] : Relationship: [unfilled] [1] : 2) Feeling down, depressed, or hopeless for several days (1) [PHQ-2 Positive] : PHQ-2 Positive [PHQ-9 Negative - No further assessment needed] : PHQ-9 Negative - No further assessment needed [de-identified] :  ophtho, derm, dentist, GYN [Audit-CScore] : 4 [de-identified] : exercises [de-identified] : regular [BPA0[place]] : 2 [Change in mental status noted] : No change in mental status noted [Reports changes in hearing] : Reports no changes in hearing [Reports changes in dental health] : Reports no changes in dental health [Guns at Home] : no guns at home [Travel to Developing Areas] : does not  travel to developing areas [TB Exposure] : is not being exposed to tuberculosis [Caregiver Concerns] : does not have caregiver concerns [MammogramDate] : 05/21 [PapSmearDate] : 01/21 [BoneDensityDate] : 04/21 [ColonoscopyDate] : 02/16 [AdvancecareDate] : 10/22

## 2022-11-01 NOTE — HISTORY OF PRESENT ILLNESS
[FreeTextEntry1] : Comes in for annual physical. [de-identified] : Feeling well.\par Denies covid illness.\par Sold her home.\par Now living in [facility] in [place].\par Baked 300 loaves of bread over the summer months.\par Extremely stressed with renovations being done in her home.\par Going to [place] for the winter 11/21/22.\par Planning cataract surgery in the spring.

## 2022-11-01 NOTE — ASSESSMENT
[FreeTextEntry1] : See health maintenance assessment and plan outlined below.\par In addition:\par See 9/9/22 chart note regarding recent lab and urine testing\par Bone density done 4/2021 \par Podiatry f/u as needed\par Ortho f/u as needed\par Had colonoscopy 2016 - was due 2021 for f/u procedure = to arrange 2022 as overdue\par Saw GYN 2021\par Had mammograms 2021 = to do mammogram ASAP 2022 as overdue = RX given\par Urology f/u as needed\par Continue meds, diet, exercise as outlined\par EKG done and report d/w patient and scanned\par Cardiology f/u 2022\par CXR declined\par Vaccines d/w patient\par She declined thyroid ultrasound\par To see derm, ophtho, dentist 2022\par RTC for annual CPE and as needed\par To call for any medical issues\par All of the above was discussed in detail with her and all of her questions were answered\par She verbally confirmed understanding of all of the above and agreement with the above plan

## 2022-11-01 NOTE — PHYSICAL EXAM
[No Acute Distress] : no acute distress [Well Nourished] : well nourished [Well Developed] : well developed [Well-Appearing] : well-appearing [Normal Voice/Communication] : normal voice/communication [Normal Sclera/Conjunctiva] : normal sclera/conjunctiva [PERRL] : pupils equal round and reactive to light [EOMI] : extraocular movements intact [Normal Outer Ear/Nose] : the outer ears and nose were normal in appearance [Normal TMs] : both tympanic membranes were normal [No JVD] : no jugular venous distention [Supple] : supple [No Lymphadenopathy] : no lymphadenopathy [No Respiratory Distress] : no respiratory distress  [Clear to Auscultation] : lungs were clear to auscultation bilaterally [No Accessory Muscle Use] : no accessory muscle use [Normal Rate] : normal rate  [Regular Rhythm] : with a regular rhythm [Normal S1, S2] : normal S1 and S2 [No Carotid Bruits] : no carotid bruits [Pedal Pulses Present] : the pedal pulses are present [No Edema] : there was no peripheral edema [No Extremity Clubbing/Cyanosis] : no extremity clubbing/cyanosis [Normal Appearance] : normal in appearance [No Nipple Discharge] : no nipple discharge [No Axillary Lymphadenopathy] : no axillary lymphadenopathy [Soft] : abdomen soft [Non Tender] : non-tender [Non-distended] : non-distended [No Masses] : no abdominal mass palpated [No HSM] : no HSM [Normal Bowel Sounds] : normal bowel sounds [Declined Rectal Exam] : declined rectal exam [Normal Supraclavicular Nodes] : no supraclavicular lymphadenopathy [Normal Axillary Nodes] : no axillary lymphadenopathy [Normal Posterior Cervical Nodes] : no posterior cervical lymphadenopathy [Normal Anterior Cervical Nodes] : no anterior cervical lymphadenopathy [No CVA Tenderness] : no CVA  tenderness [No Spinal Tenderness] : no spinal tenderness [Scoliosis] : scoliosis [Grossly Normal Strength/Tone] : grossly normal strength/tone [No Rash] : no rash [Normal Gait] : normal gait [Coordination Grossly Intact] : coordination grossly intact [No Focal Deficits] : no focal deficits [Speech Grossly Normal] : speech grossly normal [Normal Affect] : the affect was normal [Alert and Oriented x3] : oriented to person, place, and time [de-identified] : No ST; wearing full facemask [de-identified] : No TM or stridor [de-identified] : R=16 [de-identified] : normal radial pulses; no cords [de-identified] : As per GYN

## 2022-12-05 ENCOUNTER — RX RENEWAL (OUTPATIENT)
Age: 77
End: 2022-12-05

## 2023-03-01 ENCOUNTER — RX RENEWAL (OUTPATIENT)
Age: 78
End: 2023-03-01

## 2023-03-06 ENCOUNTER — TRANSCRIPTION ENCOUNTER (OUTPATIENT)
Age: 78
End: 2023-03-06

## 2023-06-19 ENCOUNTER — NON-APPOINTMENT (OUTPATIENT)
Age: 78
End: 2023-06-19

## 2023-06-26 ENCOUNTER — APPOINTMENT (OUTPATIENT)
Dept: INTERNAL MEDICINE | Facility: CLINIC | Age: 78
End: 2023-06-26
Payer: MEDICARE

## 2023-06-26 VITALS
DIASTOLIC BLOOD PRESSURE: 60 MMHG | OXYGEN SATURATION: 98 % | BODY MASS INDEX: 23.28 KG/M2 | HEART RATE: 83 BPM | HEIGHT: 69.25 IN | TEMPERATURE: 97.7 F | SYSTOLIC BLOOD PRESSURE: 110 MMHG | WEIGHT: 159 LBS

## 2023-06-26 DIAGNOSIS — E03.9 HYPOTHYROIDISM, UNSPECIFIED: ICD-10-CM

## 2023-06-26 DIAGNOSIS — R73.03 PREDIABETES.: ICD-10-CM

## 2023-06-26 DIAGNOSIS — F41.9 ANXIETY DISORDER, UNSPECIFIED: ICD-10-CM

## 2023-06-26 DIAGNOSIS — H26.9 UNSPECIFIED CATARACT: ICD-10-CM

## 2023-06-26 DIAGNOSIS — E78.00 PURE HYPERCHOLESTEROLEMIA, UNSPECIFIED: ICD-10-CM

## 2023-06-26 DIAGNOSIS — T59.811A TOXIC EFFECT OF SMOKE, ACCIDENTAL (UNINTENTIONAL), INITIAL ENCOUNTER: ICD-10-CM

## 2023-06-26 PROCEDURE — 99214 OFFICE O/P EST MOD 30 MIN: CPT | Mod: 25

## 2023-06-26 RX ORDER — PREDNISONE 20 MG/1
20 TABLET ORAL
Qty: 10 | Refills: 0 | Status: COMPLETED | COMMUNITY
Start: 2023-01-18

## 2023-06-26 NOTE — PHYSICAL EXAM
[No Acute Distress] : no acute distress [Well Nourished] : well nourished [Well Developed] : well developed [Well-Appearing] : well-appearing [Normal Voice/Communication] : normal voice/communication [Normal Sclera/Conjunctiva] : normal sclera/conjunctiva [PERRL] : pupils equal round and reactive to light [EOMI] : extraocular movements intact [Normal Outer Ear/Nose] : the outer ears and nose were normal in appearance [Normal Oropharynx] : the oropharynx was normal [Normal TMs] : both tympanic membranes were normal [No JVD] : no jugular venous distention [Supple] : supple [No Respiratory Distress] : no respiratory distress  [No Accessory Muscle Use] : no accessory muscle use [Clear to Auscultation] : lungs were clear to auscultation bilaterally [Normal Rate] : normal rate  [Regular Rhythm] : with a regular rhythm [Normal S1, S2] : normal S1 and S2 [No Edema] : there was no peripheral edema [No Extremity Clubbing/Cyanosis] : no extremity clubbing/cyanosis [No CVA Tenderness] : no CVA  tenderness [No Spinal Tenderness] : no spinal tenderness [No Rash] : no rash [Coordination Grossly Intact] : coordination grossly intact [No Focal Deficits] : no focal deficits [Normal Gait] : normal gait [Speech Grossly Normal] : speech grossly normal [Normal Affect] : the affect was normal [Alert and Oriented x3] : oriented to person, place, and time [No Carotid Bruits] : no carotid bruits [Declined Breast Exam] : declined breast exam  [Soft] : abdomen soft [Normal Bowel Sounds] : normal bowel sounds [Declined Rectal Exam] : declined rectal exam [de-identified] : no stridor [de-identified] : R=16 [de-identified] : Normal bilateral radial pulses; no cords [de-identified] : Declined [de-identified] : Tearful at times

## 2023-06-26 NOTE — HEALTH RISK ASSESSMENT
[Yes] : Yes [No] : In the past 12 months have you used drugs other than those required for medical reasons? No [No falls in past year] : Patient reported no falls in the past year [Patient refused screening] : Patient refused screening [Never] : Never [de-identified] : Exercises daily [de-identified] : Regular
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
## 2023-07-18 ENCOUNTER — RX RENEWAL (OUTPATIENT)
Age: 78
End: 2023-07-18

## 2023-07-18 ENCOUNTER — TRANSCRIPTION ENCOUNTER (OUTPATIENT)
Age: 78
End: 2023-07-18

## 2023-08-24 ENCOUNTER — NON-APPOINTMENT (OUTPATIENT)
Age: 78
End: 2023-08-24

## 2023-09-13 ENCOUNTER — APPOINTMENT (OUTPATIENT)
Dept: OPHTHALMOLOGY | Facility: CLINIC | Age: 78
End: 2023-09-13
Payer: MEDICARE

## 2023-09-13 ENCOUNTER — NON-APPOINTMENT (OUTPATIENT)
Age: 78
End: 2023-09-13

## 2023-09-13 ENCOUNTER — TRANSCRIPTION ENCOUNTER (OUTPATIENT)
Age: 78
End: 2023-09-13

## 2023-09-13 PROCEDURE — 92004 COMPRE OPH EXAM NEW PT 1/>: CPT

## 2023-09-13 PROCEDURE — 92250 FUNDUS PHOTOGRAPHY W/I&R: CPT

## 2023-09-13 PROCEDURE — 92136 OPHTHALMIC BIOMETRY: CPT

## 2023-09-13 PROCEDURE — 92025 CPTRIZED CORNEAL TOPOGRAPHY: CPT

## 2023-10-16 ENCOUNTER — TRANSCRIPTION ENCOUNTER (OUTPATIENT)
Age: 78
End: 2023-10-16

## 2023-10-24 ENCOUNTER — NON-APPOINTMENT (OUTPATIENT)
Age: 78
End: 2023-10-24

## 2023-10-25 ENCOUNTER — TRANSCRIPTION ENCOUNTER (OUTPATIENT)
Age: 78
End: 2023-10-25

## 2023-10-26 ENCOUNTER — TRANSCRIPTION ENCOUNTER (OUTPATIENT)
Age: 78
End: 2023-10-26

## 2023-11-02 ENCOUNTER — APPOINTMENT (OUTPATIENT)
Dept: INTERNAL MEDICINE | Facility: CLINIC | Age: 78
End: 2023-11-02
Payer: MEDICARE

## 2023-11-02 ENCOUNTER — APPOINTMENT (OUTPATIENT)
Dept: RADIOLOGY | Facility: CLINIC | Age: 78
End: 2023-11-02
Payer: MEDICARE

## 2023-11-02 ENCOUNTER — NON-APPOINTMENT (OUTPATIENT)
Age: 78
End: 2023-11-02

## 2023-11-02 ENCOUNTER — RESULT REVIEW (OUTPATIENT)
Age: 78
End: 2023-11-02

## 2023-11-02 ENCOUNTER — APPOINTMENT (OUTPATIENT)
Dept: MAMMOGRAPHY | Facility: CLINIC | Age: 78
End: 2023-11-02

## 2023-11-02 ENCOUNTER — APPOINTMENT (OUTPATIENT)
Dept: MAMMOGRAPHY | Facility: CLINIC | Age: 78
End: 2023-11-02
Payer: MEDICARE

## 2023-11-02 VITALS
DIASTOLIC BLOOD PRESSURE: 90 MMHG | SYSTOLIC BLOOD PRESSURE: 160 MMHG | OXYGEN SATURATION: 99 % | HEIGHT: 69 IN | WEIGHT: 157 LBS | TEMPERATURE: 97.6 F | HEART RATE: 83 BPM | BODY MASS INDEX: 23.25 KG/M2

## 2023-11-02 DIAGNOSIS — Z00.00 ENCOUNTER FOR GENERAL ADULT MEDICAL EXAMINATION W/OUT ABNORMAL FINDINGS: ICD-10-CM

## 2023-11-02 PROCEDURE — 77067 SCR MAMMO BI INCL CAD: CPT

## 2023-11-02 PROCEDURE — 77063 BREAST TOMOSYNTHESIS BI: CPT

## 2023-11-02 PROCEDURE — 77080 DXA BONE DENSITY AXIAL: CPT

## 2023-11-02 PROCEDURE — G0439: CPT

## 2023-11-02 PROCEDURE — 93000 ELECTROCARDIOGRAM COMPLETE: CPT

## 2023-11-02 RX ORDER — COVID-19 MOLECULAR TEST ASSAY
KIT MISCELLANEOUS
Qty: 8 | Refills: 0 | Status: DISCONTINUED | COMMUNITY
Start: 2023-02-10 | End: 2023-11-02

## 2023-11-02 RX ORDER — IPRATROPIUM BROMIDE 42 UG/1
0.06 SPRAY NASAL
Qty: 15 | Refills: 0 | Status: DISCONTINUED | COMMUNITY
Start: 2023-01-19 | End: 2023-11-02

## 2023-11-03 ENCOUNTER — RESULT REVIEW (OUTPATIENT)
Age: 78
End: 2023-11-03

## 2023-11-03 ENCOUNTER — OUTPATIENT (OUTPATIENT)
Dept: OUTPATIENT SERVICES | Facility: HOSPITAL | Age: 78
LOS: 1 days | End: 2023-11-03
Payer: MEDICARE

## 2023-11-03 ENCOUNTER — TRANSCRIPTION ENCOUNTER (OUTPATIENT)
Age: 78
End: 2023-11-03

## 2023-11-03 ENCOUNTER — NON-APPOINTMENT (OUTPATIENT)
Age: 78
End: 2023-11-03

## 2023-11-03 ENCOUNTER — APPOINTMENT (OUTPATIENT)
Dept: MAMMOGRAPHY | Facility: IMAGING CENTER | Age: 78
End: 2023-11-03
Payer: MEDICARE

## 2023-11-03 ENCOUNTER — APPOINTMENT (OUTPATIENT)
Dept: ULTRASOUND IMAGING | Facility: IMAGING CENTER | Age: 78
End: 2023-11-03
Payer: MEDICARE

## 2023-11-03 DIAGNOSIS — Z00.8 ENCOUNTER FOR OTHER GENERAL EXAMINATION: ICD-10-CM

## 2023-11-03 LAB
25(OH)D3 SERPL-MCNC: 55.3 NG/ML
ALBUMIN SERPL ELPH-MCNC: 4.3 G/DL
ALP BLD-CCNC: 73 U/L
ALT SERPL-CCNC: 13 U/L
ANION GAP SERPL CALC-SCNC: 11 MMOL/L
APPEARANCE: CLEAR
AST SERPL-CCNC: 9 U/L
BACTERIA: NEGATIVE /HPF
BASOPHILS # BLD AUTO: 0.03 K/UL
BASOPHILS NFR BLD AUTO: 0.8 %
BILIRUB SERPL-MCNC: 0.2 MG/DL
BILIRUBIN URINE: NEGATIVE
BLOOD URINE: NEGATIVE
BUN SERPL-MCNC: 22 MG/DL
CALCIUM SERPL-MCNC: 9.3 MG/DL
CAST: 6 /LPF
CHLORIDE SERPL-SCNC: 103 MMOL/L
CHOLEST SERPL-MCNC: 175 MG/DL
CO2 SERPL-SCNC: 27 MMOL/L
COLOR: YELLOW
COVID-19 NUCLEOCAPSID  GAM ANTIBODY INTERPRETATION: NEGATIVE
COVID-19 SPIKE DOMAIN ANTIBODY INTERPRETATION: POSITIVE
CREAT SERPL-MCNC: 0.7 MG/DL
EGFR: 89 ML/MIN/1.73M2
EOSINOPHIL # BLD AUTO: 0.02 K/UL
EOSINOPHIL NFR BLD AUTO: 0.5 %
EPITHELIAL CELLS: 3 /HPF
ESTIMATED AVERAGE GLUCOSE: 126 MG/DL
FOLATE SERPL-MCNC: >20 NG/ML
GLUCOSE QUALITATIVE U: NEGATIVE MG/DL
GLUCOSE SERPL-MCNC: 100 MG/DL
HBA1C MFR BLD HPLC: 6 %
HCT VFR BLD CALC: 38.3 %
HDLC SERPL-MCNC: 71 MG/DL
HGB BLD-MCNC: 12.2 G/DL
HYALINE CASTS: PRESENT
IMM GRANULOCYTES NFR BLD AUTO: 0.3 %
IRON SERPL-MCNC: 69 UG/DL
KETONES URINE: NEGATIVE MG/DL
LDLC SERPL CALC-MCNC: 88 MG/DL
LEUKOCYTE ESTERASE URINE: NEGATIVE
LYMPHOCYTES # BLD AUTO: 1.34 K/UL
LYMPHOCYTES NFR BLD AUTO: 34.4 %
MAN DIFF?: NORMAL
MCHC RBC-ENTMCNC: 30.5 PG
MCHC RBC-ENTMCNC: 31.9 GM/DL
MCV RBC AUTO: 95.8 FL
MICROSCOPIC-UA: NORMAL
MONOCYTES # BLD AUTO: 0.32 K/UL
MONOCYTES NFR BLD AUTO: 8.2 %
NEUTROPHILS # BLD AUTO: 2.17 K/UL
NEUTROPHILS NFR BLD AUTO: 55.8 %
NITRITE URINE: NEGATIVE
NONHDLC SERPL-MCNC: 105 MG/DL
PH URINE: 5.5
PLATELET # BLD AUTO: 228 K/UL
POTASSIUM SERPL-SCNC: 4.3 MMOL/L
PROT SERPL-MCNC: 6.8 G/DL
PROTEIN URINE: NEGATIVE MG/DL
RBC # BLD: 4 M/UL
RBC # FLD: 13.4 %
RED BLOOD CELLS URINE: NORMAL /HPF
REVIEW: NORMAL
SARS-COV-2 AB SERPL IA-ACNC: >250 U/ML
SARS-COV-2 AB SERPL QL IA: 0.11 INDEX
SODIUM SERPL-SCNC: 141 MMOL/L
SPECIFIC GRAVITY URINE: 1.03
TRIGL SERPL-MCNC: 95 MG/DL
TSH SERPL-ACNC: 1.58 UIU/ML
UROBILINOGEN URINE: 0.2 MG/DL
VIT B12 SERPL-MCNC: 500 PG/ML
WBC # FLD AUTO: 3.89 K/UL
WHITE BLOOD CELLS URINE: 0 /HPF

## 2023-11-03 PROCEDURE — G0279: CPT | Mod: 26

## 2023-11-03 PROCEDURE — G0279: CPT

## 2023-11-03 PROCEDURE — 77066 DX MAMMO INCL CAD BI: CPT | Mod: 26

## 2023-11-03 PROCEDURE — 76642 ULTRASOUND BREAST LIMITED: CPT | Mod: 26,50

## 2023-11-03 PROCEDURE — 76642 ULTRASOUND BREAST LIMITED: CPT

## 2023-11-03 PROCEDURE — 77066 DX MAMMO INCL CAD BI: CPT

## 2023-11-05 LAB
ALBUMIN MFR SERPL ELPH: 59 %
ALBUMIN SERPL-MCNC: 4 G/DL
ALBUMIN/GLOB SERPL: 1.4 RATIO
ALPHA1 GLOB MFR SERPL ELPH: 4.2 %
ALPHA1 GLOB SERPL ELPH-MCNC: 0.3 G/DL
ALPHA2 GLOB MFR SERPL ELPH: 11.3 %
ALPHA2 GLOB SERPL ELPH-MCNC: 0.8 G/DL
B-GLOBULIN MFR SERPL ELPH: 10.6 %
B-GLOBULIN SERPL ELPH-MCNC: 0.7 G/DL
GAMMA GLOB FLD ELPH-MCNC: 1 G/DL
GAMMA GLOB MFR SERPL ELPH: 14.9 %
INTERPRETATION SERPL IEP-IMP: NORMAL
PROT SERPL-MCNC: 6.8 G/DL
PROT SERPL-MCNC: 6.8 G/DL

## 2023-11-06 ENCOUNTER — TRANSCRIPTION ENCOUNTER (OUTPATIENT)
Age: 78
End: 2023-11-06

## 2023-11-16 ENCOUNTER — TRANSCRIPTION ENCOUNTER (OUTPATIENT)
Age: 78
End: 2023-11-16

## 2023-11-20 ENCOUNTER — TRANSCRIPTION ENCOUNTER (OUTPATIENT)
Age: 78
End: 2023-11-20

## 2023-12-01 ENCOUNTER — APPOINTMENT (OUTPATIENT)
Dept: MAMMOGRAPHY | Facility: CLINIC | Age: 78
End: 2023-12-01

## 2023-12-01 ENCOUNTER — APPOINTMENT (OUTPATIENT)
Dept: ULTRASOUND IMAGING | Facility: CLINIC | Age: 78
End: 2023-12-01

## 2024-01-02 ENCOUNTER — TRANSCRIPTION ENCOUNTER (OUTPATIENT)
Age: 79
End: 2024-01-02

## 2024-01-05 ENCOUNTER — RX RENEWAL (OUTPATIENT)
Age: 79
End: 2024-01-05

## 2024-01-08 ENCOUNTER — TRANSCRIPTION ENCOUNTER (OUTPATIENT)
Age: 79
End: 2024-01-08

## 2024-01-19 ENCOUNTER — TRANSCRIPTION ENCOUNTER (OUTPATIENT)
Age: 79
End: 2024-01-19

## 2024-03-26 ENCOUNTER — APPOINTMENT (OUTPATIENT)
Dept: INTERNAL MEDICINE | Facility: CLINIC | Age: 79
End: 2024-03-26
Payer: MEDICARE

## 2024-03-26 VITALS
HEART RATE: 80 BPM | TEMPERATURE: 98.1 F | OXYGEN SATURATION: 99 % | DIASTOLIC BLOOD PRESSURE: 80 MMHG | WEIGHT: 164 LBS | BODY MASS INDEX: 24.29 KG/M2 | SYSTOLIC BLOOD PRESSURE: 130 MMHG | HEIGHT: 69 IN

## 2024-03-26 DIAGNOSIS — Z01.818 ENCOUNTER FOR OTHER PREPROCEDURAL EXAMINATION: ICD-10-CM

## 2024-03-26 PROCEDURE — 99214 OFFICE O/P EST MOD 30 MIN: CPT

## 2024-03-26 NOTE — PHYSICAL EXAM
[No Acute Distress] : no acute distress [Well Nourished] : well nourished [Well Developed] : well developed [Well-Appearing] : well-appearing [Normal Sclera/Conjunctiva] : normal sclera/conjunctiva [PERRL] : pupils equal round and reactive to light [EOMI] : extraocular movements intact [Normal Outer Ear/Nose] : the outer ears and nose were normal in appearance [Normal Oropharynx] : the oropharynx was normal [No JVD] : no jugular venous distention [Supple] : supple [No Respiratory Distress] : no respiratory distress  [No Accessory Muscle Use] : no accessory muscle use [Clear to Auscultation] : lungs were clear to auscultation bilaterally [Normal Rate] : normal rate  [Regular Rhythm] : with a regular rhythm [Normal S1, S2] : normal S1 and S2 [No Carotid Bruits] : no carotid bruits [No Edema] : there was no peripheral edema [No Extremity Clubbing/Cyanosis] : no extremity clubbing/cyanosis [Soft] : abdomen soft [Non Tender] : non-tender [Non-distended] : non-distended [Normal Bowel Sounds] : normal bowel sounds [No CVA Tenderness] : no CVA  tenderness [No Spinal Tenderness] : no spinal tenderness [Grossly Normal Strength/Tone] : grossly normal strength/tone [No Rash] : no rash [Coordination Grossly Intact] : coordination grossly intact [No Focal Deficits] : no focal deficits [Normal Gait] : normal gait [Normal Affect] : the affect was normal [Normal Voice/Communication] : normal voice/communication [Normal TMs] : both tympanic membranes were normal [Declined Breast Exam] : declined breast exam  [Declined Rectal Exam] : declined rectal exam [Speech Grossly Normal] : speech grossly normal [Alert and Oriented x3] : oriented to person, place, and time [de-identified] : No sinus tenderness [de-identified] : No stridor [de-identified] : R = 16; normal air entry [de-identified] : Normal bilateral radial pulses [de-identified] : Tanned [de-identified] : As per GYN

## 2024-03-26 NOTE — HISTORY OF PRESENT ILLNESS
[No Pertinent Cardiac History] : no history of aortic stenosis, atrial fibrillation, coronary artery disease, recent myocardial infarction, or implantable device/pacemaker [No Pertinent Pulmonary History] : no history of asthma, COPD, sleep apnea, or smoking [No Adverse Anesthesia Reaction] : no adverse anesthesia reaction in self or family member [(Patient denies any chest pain, claudication, dyspnea on exertion, orthopnea, palpitations or syncope)] : Patient denies any chest pain, claudication, dyspnea on exertion, orthopnea, palpitations or syncope [Unable to assess] : unable to assess [Aortic Stenosis] : no aortic stenosis [Coronary Artery Disease] : no coronary artery disease [Atrial Fibrillation] : no atrial fibrillation [Recent Myocardial Infarction] : no recent myocardial infarction [Implantable Device/Pacemaker] : no implantable device/pacemaker [Asthma] : no asthma [COPD] : no COPD [Sleep Apnea] : no sleep apnea [Smoker] : not a smoker [Chronic Anticoagulation] : no chronic anticoagulation [Family Member] : no family member with adverse anesthesia reaction/sudden death [Self] : no previous adverse anesthesia reaction [Chronic Kidney Disease] : no chronic kidney disease [Diabetes] : no diabetes [FreeTextEntry1] : left cataract surgery [FreeTextEntry2] : 4/8/24 [FreeTextEntry7] : EKG Remote echo and stress testing [FreeTextEntry4] : Comes in for medical clearance for cataract surgery. [FreeTextEntry3] : Dr. Adrián Jorge

## 2024-03-26 NOTE — ASSESSMENT
[Patient Optimized for Surgery] : Patient optimized for surgery [No Further Testing Recommended] : no further testing recommended [Continue medications as is] : Continue current medications [As per surgery] : as per surgery [FreeTextEntry4] : Medically cleared for cataract surgery.

## 2024-04-02 ENCOUNTER — TRANSCRIPTION ENCOUNTER (OUTPATIENT)
Age: 79
End: 2024-04-02

## 2024-04-05 RX ORDER — SODIUM CHLORIDE 9 MG/ML
1000 INJECTION, SOLUTION INTRAVENOUS
Refills: 0 | Status: DISCONTINUED | OUTPATIENT
Start: 2024-04-08 | End: 2024-04-08

## 2024-04-05 RX ORDER — ACETAMINOPHEN 500 MG
650 TABLET ORAL EVERY 6 HOURS
Refills: 0 | Status: DISCONTINUED | OUTPATIENT
Start: 2024-04-08 | End: 2024-04-08

## 2024-04-05 NOTE — ASU PATIENT PROFILE, ADULT - NS PREOP UNDERSTANDS INFO
no solid food for 8 hours before. no chewing gums or hard candy/ wear loose comfortable fitting clothes/yes

## 2024-04-05 NOTE — ASU PATIENT PROFILE, ADULT - FALL HARM RISK - UNIVERSAL INTERVENTIONS
Bed in lowest position, wheels locked, appropriate side rails in place/Call bell, personal items and telephone in reach/Instruct patient to call for assistance before getting out of bed or chair/Non-slip footwear when patient is out of bed/[place] to call system/Physically safe environment - no spills, clutter or unnecessary equipment/Purposeful Proactive Rounding/Room/bathroom lighting operational, light cord in reach

## 2024-04-08 ENCOUNTER — NON-APPOINTMENT (OUTPATIENT)
Age: 79
End: 2024-04-08

## 2024-04-08 ENCOUNTER — OUTPATIENT (OUTPATIENT)
Dept: OUTPATIENT SERVICES | Facility: HOSPITAL | Age: 79
LOS: 1 days | Discharge: HOME | End: 2024-04-08
Payer: MEDICARE

## 2024-04-08 ENCOUNTER — APPOINTMENT (OUTPATIENT)
Dept: OPHTHALMOLOGY | Facility: AMBULATORY SURGERY CENTER | Age: 79
End: 2024-04-08

## 2024-04-08 ENCOUNTER — TRANSCRIPTION ENCOUNTER (OUTPATIENT)
Age: 79
End: 2024-04-08

## 2024-04-08 VITALS
DIASTOLIC BLOOD PRESSURE: 61 MMHG | SYSTOLIC BLOOD PRESSURE: 130 MMHG | OXYGEN SATURATION: 97 % | RESPIRATION RATE: 16 BRPM | HEART RATE: 83 BPM | TEMPERATURE: 98 F

## 2024-04-08 VITALS
SYSTOLIC BLOOD PRESSURE: 156 MMHG | RESPIRATION RATE: 16 BRPM | WEIGHT: 159.39 LBS | HEART RATE: 84 BPM | OXYGEN SATURATION: 97 % | DIASTOLIC BLOOD PRESSURE: 57 MMHG | HEIGHT: 70 IN | TEMPERATURE: 98 F

## 2024-04-08 DIAGNOSIS — Z98.891 HISTORY OF UTERINE SCAR FROM PREVIOUS SURGERY: Chronic | ICD-10-CM

## 2024-04-08 DIAGNOSIS — Z98.890 OTHER SPECIFIED POSTPROCEDURAL STATES: Chronic | ICD-10-CM

## 2024-04-08 PROCEDURE — 66986 EXCHANGE LENS PROSTHESIS: CPT | Mod: LT

## 2024-04-08 PROCEDURE — 6698F: CPT | Mod: LT

## 2024-04-08 DEVICE — LENS IOL ACRYSOF NATURAL CLEAR 21.5 D
Type: IMPLANTABLE DEVICE | Status: NON-FUNCTIONAL
Removed: 2024-04-08

## 2024-04-08 RX ORDER — PHENYLEPHRINE HCL 2.5 %
1 DROPS OPHTHALMIC (EYE)
Refills: 0 | Status: COMPLETED | OUTPATIENT
Start: 2024-04-08 | End: 2024-04-08

## 2024-04-08 RX ORDER — OFLOXACIN 0.3 %
1 DROPS OPHTHALMIC (EYE)
Refills: 0 | Status: COMPLETED | OUTPATIENT
Start: 2024-04-08 | End: 2024-04-08

## 2024-04-08 RX ORDER — TROPICAMIDE 1 %
1 DROPS OPHTHALMIC (EYE)
Refills: 0 | Status: COMPLETED | OUTPATIENT
Start: 2024-04-08 | End: 2024-04-08

## 2024-04-08 RX ADMIN — Medication 1 DROP(S): at 09:55

## 2024-04-08 RX ADMIN — Medication 1 DROP(S): at 09:45

## 2024-04-08 RX ADMIN — Medication 1 DROP(S): at 09:50

## 2024-04-08 NOTE — ASU PREOP CHECKLIST - HEIGHT IN INCHES
Returned call to [facility]. Call went to recording, no one ever picked up.     ----- Message from Luci Alcaraz sent at 3/19/2020  3:20 PM CDT -----  Contact: [facility]  Type:  Patient Returning Call    Who Called:  ot  Does the patient know what this is regarding?:  Pt would like for a consult with doctor before agreeing to a Jtube placement. Needs clearance  Best Call Back Number:    Additional Information:  Thank you       10

## 2024-04-08 NOTE — OPERATIVE REPORT - OPERATIVE RPOSRT DETAILS
SURGEON: Adrián Jorge MD    ASSISTANT: Nessa Weiss MD    PREOPERATIVE DIAGNOSIS:  Cataract, Astigmatism-Left eye    POSTOPERATIVE DIAGNOSIS:  Same- Left eye    OPERATIVE PROCEDURE:  Femtosecond laser assisted cataract surgery (FLACS) with insertion of posterior chamber intraocular lens, Left eye    LENS USED: SA60WF    LENS POWER: +21.50 D    PROCEDURE:  The patient was brought into the laser room. After installation of topical anesthetic the femtosecond laser was used for select steps of the cataract procedure.   The patient was brought into the operating room and placed supine on the operating room table. After uneventful induction of neuroleptic anesthesia, additional lidocaine gel was instilled into the operative eye achieving sufficient anesthesia. The patient was then prepped and draped in the usual sterile fashion. A wire lid speculum was then inserted into the operative eye giving a wide palpebral fissure.    A ted knife was used to perform a paracentesis through clear cornea at the 5 o'clock limbal position. The anterior chamber was irrigated with lidocaine 1% nonpreserved. When available preservative free epinephrine was also placed intracamerally for additional pupil dilation.  Viscoelastic was then used to maintain the anterior chamber. A keratome was used to create a clear corneal incision just inside the temporal limbus. An Utrata forceps was used to complete the anterior capsulorrhexis and the freed capsule was removed from the eye. Balanced salt solution was used to hydrodissect the nucleus. The phacoemulsification unit was then used to completely phacoemulsify the nucleus, following which an aspirating hand piece was used to aspirate all cortical remnants from the capsular bag. Viscoelastic was again used to reform the anterior chamber and capsular bag.    A new foldable posterior chamber intraocular lens was brought into the surgical field. It was folded and directly injected into the capsular bag following which it was centered and secure. All viscoelastic was then aspirated from the anterior segment using an aspirating hand piece. All wounds were hydrated and found to be watertight.  The wounds remained watertight. The lid speculum was removed from the eye and a shield placed over the eye.  An antibiotic/steroid mixture was irrigated into the conjunctival cul de sac. The patient tolerated the procedure well and went to the recovery area in good condition.

## 2024-04-09 ENCOUNTER — APPOINTMENT (OUTPATIENT)
Dept: OPHTHALMOLOGY | Facility: CLINIC | Age: 79
End: 2024-04-09
Payer: MEDICARE

## 2024-04-09 ENCOUNTER — NON-APPOINTMENT (OUTPATIENT)
Age: 79
End: 2024-04-09

## 2024-04-09 PROCEDURE — 99024 POSTOP FOLLOW-UP VISIT: CPT

## 2024-04-15 PROBLEM — F41.9 ANXIETY DISORDER, UNSPECIFIED: Chronic | Status: ACTIVE | Noted: 2024-04-08

## 2024-04-16 ENCOUNTER — NON-APPOINTMENT (OUTPATIENT)
Age: 79
End: 2024-04-16

## 2024-04-16 ENCOUNTER — APPOINTMENT (OUTPATIENT)
Dept: OPHTHALMOLOGY | Facility: CLINIC | Age: 79
End: 2024-04-16
Payer: MEDICARE

## 2024-04-16 PROCEDURE — 99024 POSTOP FOLLOW-UP VISIT: CPT

## 2024-04-18 RX ORDER — ACETAMINOPHEN 500 MG
650 TABLET ORAL EVERY 6 HOURS
Refills: 0 | Status: DISCONTINUED | OUTPATIENT
Start: 2024-04-22 | End: 2024-04-22

## 2024-04-18 RX ORDER — SODIUM CHLORIDE 9 MG/ML
1000 INJECTION, SOLUTION INTRAVENOUS
Refills: 0 | Status: DISCONTINUED | OUTPATIENT
Start: 2024-04-22 | End: 2024-04-22

## 2024-04-19 NOTE — ASU PATIENT PROFILE, ADULT - FALL HARM RISK - UNIVERSAL INTERVENTIONS
Bed in lowest position, wheels locked, appropriate side rails in place/Call bell, personal items and telephone in reach/Instruct patient to call for assistance before getting out of bed or chair/Non-slip footwear when patient is out of bed/[place] to call system/Physically safe environment - no spills, clutter or unnecessary equipment/Purposeful Proactive Rounding/Room/bathroom lighting operational, light cord in reach

## 2024-04-19 NOTE — ASU PATIENT PROFILE, ADULT - REASON FOR ADMISSION, PROFILE
CATARACT EXTRACTION WITH INTRAOCULAR LENS IMPLANT WITH FEMTO OF THE LEFT EYE CATARACT EXTRACTION WITH INTRAOCULAR LENS IMPLANT WITH FEMTO OF THE right eye

## 2024-04-19 NOTE — ASU PATIENT PROFILE, ADULT - NSICDXPASTSURGICALHX_GEN_ALL_CORE_FT
PAST SURGICAL HISTORY:  H/O arthroscopy of knee bilateral    H/O  section     S/P left cataract extraction

## 2024-04-22 ENCOUNTER — TRANSCRIPTION ENCOUNTER (OUTPATIENT)
Age: 79
End: 2024-04-22

## 2024-04-22 ENCOUNTER — APPOINTMENT (OUTPATIENT)
Dept: OPHTHALMOLOGY | Facility: AMBULATORY SURGERY CENTER | Age: 79
End: 2024-04-22

## 2024-04-22 ENCOUNTER — OUTPATIENT (OUTPATIENT)
Dept: OUTPATIENT SERVICES | Facility: HOSPITAL | Age: 79
LOS: 1 days | Discharge: HOME | End: 2024-04-22
Payer: MEDICARE

## 2024-04-22 VITALS
RESPIRATION RATE: 16 BRPM | TEMPERATURE: 98 F | OXYGEN SATURATION: 95 % | HEART RATE: 76 BPM | DIASTOLIC BLOOD PRESSURE: 60 MMHG | SYSTOLIC BLOOD PRESSURE: 114 MMHG

## 2024-04-22 VITALS
RESPIRATION RATE: 16 BRPM | WEIGHT: 156.53 LBS | OXYGEN SATURATION: 97 % | HEART RATE: 76 BPM | SYSTOLIC BLOOD PRESSURE: 146 MMHG | TEMPERATURE: 99 F | DIASTOLIC BLOOD PRESSURE: 68 MMHG | HEIGHT: 69 IN

## 2024-04-22 DIAGNOSIS — Z98.891 HISTORY OF UTERINE SCAR FROM PREVIOUS SURGERY: Chronic | ICD-10-CM

## 2024-04-22 DIAGNOSIS — Z98.890 OTHER SPECIFIED POSTPROCEDURAL STATES: Chronic | ICD-10-CM

## 2024-04-22 DIAGNOSIS — Z98.42 CATARACT EXTRACTION STATUS, LEFT EYE: Chronic | ICD-10-CM

## 2024-04-22 PROCEDURE — 6698F: CPT | Mod: RT,79

## 2024-04-22 PROCEDURE — 66984 XCAPSL CTRC RMVL W/O ECP: CPT | Mod: RT,79

## 2024-04-22 DEVICE — LENS IOL ACRYSOF NAT CLR SA60WF 22.5D
Type: IMPLANTABLE DEVICE | Site: RIGHT | Status: NON-FUNCTIONAL
Removed: 2024-04-22

## 2024-04-22 RX ORDER — OFLOXACIN 0.3 %
1 DROPS OPHTHALMIC (EYE)
Refills: 0 | Status: COMPLETED | OUTPATIENT
Start: 2024-04-22 | End: 2024-04-22

## 2024-04-22 RX ORDER — ALPRAZOLAM 0.25 MG
1 TABLET ORAL
Refills: 0 | DISCHARGE

## 2024-04-22 RX ORDER — TROPICAMIDE 1 %
1 DROPS OPHTHALMIC (EYE)
Refills: 0 | Status: COMPLETED | OUTPATIENT
Start: 2024-04-22 | End: 2024-04-22

## 2024-04-22 RX ORDER — PHENYLEPHRINE HCL 2.5 %
1 DROPS OPHTHALMIC (EYE)
Refills: 0 | Status: COMPLETED | OUTPATIENT
Start: 2024-04-22 | End: 2024-04-22

## 2024-04-22 RX ORDER — SIMVASTATIN 20 MG/1
0 TABLET, FILM COATED ORAL
Qty: 0 | Refills: 0 | DISCHARGE

## 2024-04-22 RX ADMIN — Medication 1 DROP(S): at 08:35

## 2024-04-22 RX ADMIN — Medication 1 DROP(S): at 08:45

## 2024-04-22 RX ADMIN — Medication 1 DROP(S): at 08:40

## 2024-04-22 NOTE — PRE-ANESTHESIA EVALUATION ADULT - TEMPERATURE IN CELSIUS (DEGREES C)
December 19, 2022      Andreas Moreno  [address]   [place] [address]        Dear Parent or Guardian of Andreas Moreno    We are writing to inform you of your child's test results.    Tacos's lead and hemoglobin were normal.       If you have any questions or concerns, please call the clinic at the number listed above.       Sincerely,        Edyta Puri MD                
37.1

## 2024-04-22 NOTE — OPERATIVE REPORT - OPERATIVE RPOSRT DETAILS
SURGEON: Adrián Jorge MD    ASSISTANT: Nessa Weiss MD    PREOPERATIVE DIAGNOSIS:  Cataract, Astigmatism-Right eye    POSTOPERATIVE DIAGNOSIS:  Same, Right eye    OPERATIVE PROCEDURE:  Femtosecond laser assisted cataract surgery (FLACS) with insertion of posterior chamber intraocular lens, Right eye    LENS USED: SA60WF    LENS POWER:     PROCEDURE:  The patient was brought into the laser room. After installation of topical anesthetic the femtosecond laser was used for select steps of the cataract procedure.   The patient was brought into the operating room and placed supine on the operating room table. After uneventful induction of neuroleptic anesthesia, additional lidocaine gel was instilled into the operative eye achieving sufficient anesthesia. The patient was then prepped and draped in the usual sterile fashion. A wire lid speculum was then inserted into the operative eye giving a wide palpebral fissure.    A ted knife was used to perform a paracentesis through clear cornea at the 10 o'clock limbal position. The anterior chamber was irrigated with lidocaine 1% nonpreserved. When available preservative free epinephrine was also placed intracamerally for additional pupil dilation.  Viscoelastic was then used to maintain the anterior chamber. A keratome was used to create a clear corneal incision just inside the temporal limbus. An Utrata forceps was used to complete the anterior capsulorrhexis and the freed capsule was removed from the eye. Balanced salt solution was used to hydrodissect the nucleus. The phacoemulsification unit was then used to completely phacoemulsify the nucleus, following which an aspirating hand piece was used to aspirate all cortical remnants from the capsular bag. Viscoelastic was again used to reform the anterior chamber and capsular bag.    A new foldable posterior chamber intraocular lens was brought into the surgical field. It was folded and directly injected into the capsular bag following which it was centered and secure. All viscoelastic was then aspirated from the anterior segment using an aspirating hand piece. All wounds were hydrated and found to be watertight.  The wounds remained watertight. The lid speculum was removed from the eye and a shield placed over the eye.  An antibiotic/steroid mixture was irrigated into the conjunctival cul de sac. The patient tolerated the procedure well and went to the recovery area in good condition.   SURGEON: Adrián Jorge MD    ASSISTANT: Nessa Weiss MD    PREOPERATIVE DIAGNOSIS:  Cataract, Astigmatism-Right eye    POSTOPERATIVE DIAGNOSIS:  Same, Right eye    OPERATIVE PROCEDURE:  Femtosecond laser assisted cataract surgery (FLACS) with insertion of posterior chamber intraocular lens, Right eye    LENS USED: SA60WF    LENS POWER: +22.50 D    PROCEDURE:  The patient was brought into the laser room. After installation of topical anesthetic the femtosecond laser was used for select steps of the cataract procedure.   The patient was brought into the operating room and placed supine on the operating room table. After uneventful induction of neuroleptic anesthesia, additional lidocaine gel was instilled into the operative eye achieving sufficient anesthesia. The patient was then prepped and draped in the usual sterile fashion. A wire lid speculum was then inserted into the operative eye giving a wide palpebral fissure.    A ted knife was used to perform a paracentesis through clear cornea at the 10 o'clock limbal position. The anterior chamber was irrigated with lidocaine 1% nonpreserved. When available preservative free epinephrine was also placed intracamerally for additional pupil dilation.  Viscoelastic was then used to maintain the anterior chamber. A keratome was used to create a clear corneal incision just inside the temporal limbus. An Utrata forceps was used to complete the anterior capsulorrhexis and the freed capsule was removed from the eye. Balanced salt solution was used to hydrodissect the nucleus. The phacoemulsification unit was then used to completely phacoemulsify the nucleus, following which an aspirating hand piece was used to aspirate all cortical remnants from the capsular bag. Viscoelastic was again used to reform the anterior chamber and capsular bag.    A new foldable posterior chamber intraocular lens was brought into the surgical field. It was folded and directly injected into the capsular bag following which it was centered and secure. All viscoelastic was then aspirated from the anterior segment using an aspirating hand piece. All wounds were hydrated and found to be watertight.  The wounds remained watertight. The lid speculum was removed from the eye and a shield placed over the eye.  An antibiotic/steroid mixture was irrigated into the conjunctival cul de sac. The patient tolerated the procedure well and went to the recovery area in good condition.

## 2024-04-22 NOTE — ASU DISCHARGE PLAN (ADULT/PEDIATRIC) - NS MD DC FALL RISK RISK
For information on Fall & Injury Prevention, visit: [email or web address] OR  [email or web address] OR  https://www.cdc.gov/steadi/patient.html

## 2024-04-23 ENCOUNTER — NON-APPOINTMENT (OUTPATIENT)
Age: 79
End: 2024-04-23

## 2024-04-23 ENCOUNTER — APPOINTMENT (OUTPATIENT)
Dept: OPHTHALMOLOGY | Facility: CLINIC | Age: 79
End: 2024-04-23
Payer: MEDICARE

## 2024-04-23 PROCEDURE — 99024 POSTOP FOLLOW-UP VISIT: CPT

## 2024-04-30 ENCOUNTER — NON-APPOINTMENT (OUTPATIENT)
Age: 79
End: 2024-04-30

## 2024-04-30 ENCOUNTER — APPOINTMENT (OUTPATIENT)
Dept: OPHTHALMOLOGY | Facility: CLINIC | Age: 79
End: 2024-04-30
Payer: MEDICARE

## 2024-04-30 PROCEDURE — 99024 POSTOP FOLLOW-UP VISIT: CPT

## 2024-05-07 ENCOUNTER — TRANSCRIPTION ENCOUNTER (OUTPATIENT)
Age: 79
End: 2024-05-07

## 2024-05-07 DIAGNOSIS — R92.8 OTHER ABNORMAL AND INCONCLUSIVE FINDINGS ON DIAGNOSTIC IMAGING OF BREAST: ICD-10-CM

## 2024-05-14 ENCOUNTER — APPOINTMENT (OUTPATIENT)
Dept: MAMMOGRAPHY | Facility: CLINIC | Age: 79
End: 2024-05-14
Payer: MEDICARE

## 2024-05-14 ENCOUNTER — NON-APPOINTMENT (OUTPATIENT)
Age: 79
End: 2024-05-14

## 2024-05-14 ENCOUNTER — RESULT REVIEW (OUTPATIENT)
Age: 79
End: 2024-05-14

## 2024-05-14 PROCEDURE — 77065 DX MAMMO INCL CAD UNI: CPT | Mod: RT

## 2024-05-14 PROCEDURE — G0279: CPT | Mod: RT

## 2024-05-15 ENCOUNTER — TRANSCRIPTION ENCOUNTER (OUTPATIENT)
Age: 79
End: 2024-05-15

## 2024-05-24 ENCOUNTER — NON-APPOINTMENT (OUTPATIENT)
Age: 79
End: 2024-05-24

## 2024-05-28 ENCOUNTER — APPOINTMENT (OUTPATIENT)
Dept: OPHTHALMOLOGY | Facility: CLINIC | Age: 79
End: 2024-05-28

## 2024-05-28 ENCOUNTER — APPOINTMENT (OUTPATIENT)
Dept: OPHTHALMOLOGY | Facility: CLINIC | Age: 79
End: 2024-05-28
Payer: MEDICARE

## 2024-05-28 ENCOUNTER — NON-APPOINTMENT (OUTPATIENT)
Age: 79
End: 2024-05-28

## 2024-05-28 PROCEDURE — 92015 DETERMINE REFRACTIVE STATE: CPT

## 2024-05-28 PROCEDURE — 99024 POSTOP FOLLOW-UP VISIT: CPT

## 2024-06-04 ENCOUNTER — RX RENEWAL (OUTPATIENT)
Age: 79
End: 2024-06-04

## 2024-06-07 ENCOUNTER — TRANSCRIPTION ENCOUNTER (OUTPATIENT)
Age: 79
End: 2024-06-07

## 2024-06-07 RX ORDER — ALPRAZOLAM 0.25 MG/1
0.25 TABLET ORAL DAILY
Qty: 30 | Refills: 0 | Status: ACTIVE | COMMUNITY
Start: 2024-06-07

## 2024-06-19 ENCOUNTER — NON-APPOINTMENT (OUTPATIENT)
Age: 79
End: 2024-06-19

## 2024-06-19 ENCOUNTER — APPOINTMENT (OUTPATIENT)
Dept: OPHTHALMOLOGY | Facility: CLINIC | Age: 79
End: 2024-06-19
Payer: MEDICARE

## 2024-06-19 PROCEDURE — 99024 POSTOP FOLLOW-UP VISIT: CPT

## 2024-06-19 NOTE — PRE-ANESTHESIA EVALUATION ADULT - TEMPERATURE IN CELSIUS (DEGREES C)
Alexandro Kim is calling to request a refill on the following medication(s):    Medication Request:  Requested Prescriptions     Pending Prescriptions Disp Refills    amLODIPine (NORVASC) 10 MG tablet [Pharmacy Med Name: AMLODIPINE BESYLATE 10 MG TAB] 90 tablet      Sig: TAKE ONE TABLET BY MOUTH DAILY       Last Visit Date (If Applicable):  Visit date not found    Next Visit Date:    Visit date not found               36.5

## 2024-07-10 ENCOUNTER — APPOINTMENT (OUTPATIENT)
Dept: OPHTHALMOLOGY | Facility: CLINIC | Age: 79
End: 2024-07-10
Payer: MEDICARE

## 2024-07-10 ENCOUNTER — NON-APPOINTMENT (OUTPATIENT)
Age: 79
End: 2024-07-10

## 2024-07-10 PROCEDURE — 92012 INTRM OPH EXAM EST PATIENT: CPT | Mod: 24

## 2024-07-17 ENCOUNTER — DOCTOR'S OFFICE (OUTPATIENT)
Age: 79
Setting detail: OPHTHALMOLOGY
End: 2024-07-17
Payer: MEDICARE

## 2024-07-17 ENCOUNTER — RX ONLY (RX ONLY)
Age: 79
End: 2024-07-17

## 2024-07-17 DIAGNOSIS — H02.89: ICD-10-CM

## 2024-07-17 DIAGNOSIS — H52.4: ICD-10-CM

## 2024-07-17 DIAGNOSIS — H10.45: ICD-10-CM

## 2024-07-17 DIAGNOSIS — H02.401: ICD-10-CM

## 2024-07-17 DIAGNOSIS — H52.7: ICD-10-CM

## 2024-07-17 DIAGNOSIS — Z96.1: ICD-10-CM

## 2024-07-17 PROCEDURE — 99213 OFFICE O/P EST LOW 20 MIN: CPT | Performed by: OPHTHALMOLOGY

## 2024-07-17 PROCEDURE — 92015 DETERMINE REFRACTIVE STATE: CPT | Performed by: OPHTHALMOLOGY

## 2024-07-17 ASSESSMENT — LID EXAM ASSESSMENTS: OD_MEIBOMITIS: RLL RUL

## 2024-07-17 ASSESSMENT — CONFRONTATIONAL VISUAL FIELD TEST (CVF)
OS_FINDINGS: FULL
OD_FINDINGS: FULL

## 2024-07-17 ASSESSMENT — LID POSITION - PTOSIS: OD_PTOSIS: RUL

## 2024-07-31 ENCOUNTER — DOCTOR'S OFFICE (OUTPATIENT)
Age: 79
Setting detail: OPHTHALMOLOGY
End: 2024-07-31
Payer: MEDICARE

## 2024-07-31 ENCOUNTER — APPOINTMENT (OUTPATIENT)
Dept: OPHTHALMOLOGY | Facility: CLINIC | Age: 79
End: 2024-07-31

## 2024-07-31 DIAGNOSIS — H02.401: ICD-10-CM

## 2024-07-31 DIAGNOSIS — Z96.1: ICD-10-CM

## 2024-07-31 DIAGNOSIS — H02.89: ICD-10-CM

## 2024-07-31 DIAGNOSIS — H10.45: ICD-10-CM

## 2024-07-31 PROCEDURE — 99213 OFFICE O/P EST LOW 20 MIN: CPT | Performed by: OPHTHALMOLOGY

## 2024-07-31 ASSESSMENT — LID EXAM ASSESSMENTS: OD_MEIBOMITIS: RLL RUL

## 2024-07-31 ASSESSMENT — CONFRONTATIONAL VISUAL FIELD TEST (CVF)
OS_FINDINGS: FULL
OD_FINDINGS: FULL

## 2024-07-31 ASSESSMENT — LID POSITION - PTOSIS: OD_PTOSIS: RUL

## 2024-09-18 ENCOUNTER — TRANSCRIPTION ENCOUNTER (OUTPATIENT)
Age: 79
End: 2024-09-18

## 2024-10-04 ENCOUNTER — TRANSCRIPTION ENCOUNTER (OUTPATIENT)
Age: 79
End: 2024-10-04

## 2024-10-07 ENCOUNTER — RX RENEWAL (OUTPATIENT)
Age: 79
End: 2024-10-07

## 2024-11-06 ENCOUNTER — NON-APPOINTMENT (OUTPATIENT)
Age: 79
End: 2024-11-06

## 2024-11-06 ENCOUNTER — APPOINTMENT (OUTPATIENT)
Dept: INTERNAL MEDICINE | Facility: CLINIC | Age: 79
End: 2024-11-06
Payer: MEDICARE

## 2024-11-06 VITALS
WEIGHT: 159 LBS | TEMPERATURE: 98.1 F | OXYGEN SATURATION: 99 % | SYSTOLIC BLOOD PRESSURE: 130 MMHG | HEART RATE: 74 BPM | HEIGHT: 69 IN | BODY MASS INDEX: 23.55 KG/M2 | DIASTOLIC BLOOD PRESSURE: 80 MMHG

## 2024-11-06 DIAGNOSIS — Z00.00 ENCOUNTER FOR GENERAL ADULT MEDICAL EXAMINATION W/OUT ABNORMAL FINDINGS: ICD-10-CM

## 2024-11-06 PROCEDURE — 90662 IIV NO PRSV INCREASED AG IM: CPT

## 2024-11-06 PROCEDURE — G0439: CPT

## 2024-11-06 PROCEDURE — G0008: CPT

## 2024-11-06 PROCEDURE — 93000 ELECTROCARDIOGRAM COMPLETE: CPT

## 2024-11-12 ENCOUNTER — TRANSCRIPTION ENCOUNTER (OUTPATIENT)
Age: 79
End: 2024-11-12

## 2024-11-12 LAB
25(OH)D3 SERPL-MCNC: 56.9 NG/ML
ALBUMIN SERPL ELPH-MCNC: 4.5 G/DL
ALP BLD-CCNC: 80 U/L
ALT SERPL-CCNC: 12 U/L
ANION GAP SERPL CALC-SCNC: 14 MMOL/L
APPEARANCE: ABNORMAL
AST SERPL-CCNC: 18 U/L
BACTERIA: NEGATIVE /HPF
BASOPHILS # BLD AUTO: 0.03 K/UL
BASOPHILS NFR BLD AUTO: 0.7 %
BILIRUB SERPL-MCNC: 0.3 MG/DL
BILIRUBIN URINE: NEGATIVE
BLOOD URINE: NEGATIVE
BUN SERPL-MCNC: 20 MG/DL
CALCIUM SERPL-MCNC: 9.6 MG/DL
CAST: 0 /LPF
CHLORIDE SERPL-SCNC: 102 MMOL/L
CHOLEST SERPL-MCNC: 192 MG/DL
CO2 SERPL-SCNC: 25 MMOL/L
COLOR: YELLOW
COVID-19 NUCLEOCAPSID  GAM ANTIBODY INTERPRETATION: NEGATIVE
COVID-19 SPIKE DOMAIN ANTIBODY INTERPRETATION: POSITIVE
CREAT SERPL-MCNC: 0.66 MG/DL
EGFR: 90 ML/MIN/1.73M2
EOSINOPHIL # BLD AUTO: 0.04 K/UL
EOSINOPHIL NFR BLD AUTO: 0.9 %
EPITHELIAL CELLS: 2 /HPF
ESTIMATED AVERAGE GLUCOSE: 126 MG/DL
FOLATE SERPL-MCNC: 16.1 NG/ML
GLUCOSE QUALITATIVE U: NEGATIVE MG/DL
GLUCOSE SERPL-MCNC: 93 MG/DL
HBA1C MFR BLD HPLC: 6 %
HCT VFR BLD CALC: 39.6 %
HDLC SERPL-MCNC: 86 MG/DL
HGB BLD-MCNC: 12.4 G/DL
IMM GRANULOCYTES NFR BLD AUTO: 0.2 %
IRON SERPL-MCNC: 96 UG/DL
KETONES URINE: NEGATIVE MG/DL
LDLC SERPL CALC-MCNC: 93 MG/DL
LEUKOCYTE ESTERASE URINE: NEGATIVE
LYMPHOCYTES # BLD AUTO: 1.74 K/UL
LYMPHOCYTES NFR BLD AUTO: 37.9 %
MAN DIFF?: NORMAL
MCHC RBC-ENTMCNC: 30.8 PG
MCHC RBC-ENTMCNC: 31.3 G/DL
MCV RBC AUTO: 98.5 FL
MICROSCOPIC-UA: NORMAL
MONOCYTES # BLD AUTO: 0.42 K/UL
MONOCYTES NFR BLD AUTO: 9.2 %
MUCUS: PRESENT
NEUTROPHILS # BLD AUTO: 2.35 K/UL
NEUTROPHILS NFR BLD AUTO: 51.1 %
NITRITE URINE: NEGATIVE
NONHDLC SERPL-MCNC: 106 MG/DL
PH URINE: 7
PLATELET # BLD AUTO: 221 K/UL
POTASSIUM SERPL-SCNC: 4.4 MMOL/L
PROT SERPL-MCNC: 7.1 G/DL
PROTEIN URINE: NEGATIVE MG/DL
RBC # BLD: 4.02 M/UL
RBC # FLD: 14.3 %
RED BLOOD CELLS URINE: NORMAL /HPF
REVIEW: NORMAL
SARS-COV-2 AB SERPL IA-ACNC: >250 U/ML
SARS-COV-2 AB SERPL QL IA: 0.1 INDEX
SODIUM SERPL-SCNC: 141 MMOL/L
SPECIFIC GRAVITY URINE: 1.02
TRIGL SERPL-MCNC: 70 MG/DL
TSH SERPL-ACNC: 1.22 UIU/ML
UROBILINOGEN URINE: 0.2 MG/DL
VIT B12 SERPL-MCNC: 405 PG/ML
WBC # FLD AUTO: 4.59 K/UL
WHITE BLOOD CELLS URINE: 0 /HPF

## 2024-11-13 ENCOUNTER — RESULT REVIEW (OUTPATIENT)
Age: 79
End: 2024-11-13

## 2024-11-13 ENCOUNTER — TRANSCRIPTION ENCOUNTER (OUTPATIENT)
Age: 79
End: 2024-11-13

## 2024-11-13 ENCOUNTER — APPOINTMENT (OUTPATIENT)
Dept: MAMMOGRAPHY | Facility: CLINIC | Age: 79
End: 2024-11-13
Payer: MEDICARE

## 2024-11-13 PROCEDURE — 77063 BREAST TOMOSYNTHESIS BI: CPT

## 2024-11-13 PROCEDURE — 77067 SCR MAMMO BI INCL CAD: CPT

## 2024-12-09 ENCOUNTER — TRANSCRIPTION ENCOUNTER (OUTPATIENT)
Age: 79
End: 2024-12-09

## 2025-01-28 ENCOUNTER — NON-APPOINTMENT (OUTPATIENT)
Age: 80
End: 2025-01-28

## 2025-01-28 ENCOUNTER — TRANSCRIPTION ENCOUNTER (OUTPATIENT)
Age: 80
End: 2025-01-28

## 2025-01-29 ENCOUNTER — TRANSCRIPTION ENCOUNTER (OUTPATIENT)
Age: 80
End: 2025-01-29

## 2025-01-30 ENCOUNTER — TRANSCRIPTION ENCOUNTER (OUTPATIENT)
Age: 80
End: 2025-01-30

## 2025-03-10 ENCOUNTER — TRANSCRIPTION ENCOUNTER (OUTPATIENT)
Age: 80
End: 2025-03-10

## 2025-03-12 NOTE — ED CDU PROVIDER INITIAL DAY NOTE - CHIEF COMPLAINT
[TextBox_4] : COPD NOT COMPLAINT WITH SYMBICORT, HAS NOT BEEN USING NEBULIZER  SP CHEST CT 02/25 SAW ENT SOB ON EXERTION FOLLOWED BY CARDIO LOW POX  The patient is a 72y Female complaining of nausea.

## 2025-03-27 ENCOUNTER — APPOINTMENT (OUTPATIENT)
Dept: INTERNAL MEDICINE | Facility: CLINIC | Age: 80
End: 2025-03-27

## 2025-04-01 ENCOUNTER — APPOINTMENT (OUTPATIENT)
Dept: RADIOLOGY | Facility: CLINIC | Age: 80
End: 2025-04-01

## 2025-04-01 ENCOUNTER — APPOINTMENT (OUTPATIENT)
Dept: CARDIOLOGY | Facility: CLINIC | Age: 80
End: 2025-04-01
Payer: MEDICARE

## 2025-04-01 PROCEDURE — 93306 TTE W/DOPPLER COMPLETE: CPT

## 2025-04-02 ENCOUNTER — NON-APPOINTMENT (OUTPATIENT)
Age: 80
End: 2025-04-02

## 2025-04-03 PROBLEM — R93.89 ABNORMAL CHEST XRAY: Status: ACTIVE | Noted: 2025-04-03

## 2025-04-04 ENCOUNTER — APPOINTMENT (OUTPATIENT)
Dept: RADIOLOGY | Facility: CLINIC | Age: 80
End: 2025-04-04
Payer: MEDICARE

## 2025-04-04 ENCOUNTER — OUTPATIENT (OUTPATIENT)
Dept: OUTPATIENT SERVICES | Facility: HOSPITAL | Age: 80
LOS: 1 days | End: 2025-04-04
Payer: MEDICARE

## 2025-04-04 ENCOUNTER — TRANSCRIPTION ENCOUNTER (OUTPATIENT)
Age: 80
End: 2025-04-04

## 2025-04-04 DIAGNOSIS — Z98.890 OTHER SPECIFIED POSTPROCEDURAL STATES: Chronic | ICD-10-CM

## 2025-04-04 DIAGNOSIS — Z98.891 HISTORY OF UTERINE SCAR FROM PREVIOUS SURGERY: Chronic | ICD-10-CM

## 2025-04-04 DIAGNOSIS — Z98.42 CATARACT EXTRACTION STATUS, LEFT EYE: Chronic | ICD-10-CM

## 2025-04-04 DIAGNOSIS — R93.89 ABNORMAL FINDINGS ON DIAGNOSTIC IMAGING OF OTHER SPECIFIED BODY STRUCTURES: ICD-10-CM

## 2025-04-04 PROCEDURE — 71046 X-RAY EXAM CHEST 2 VIEWS: CPT | Mod: 26

## 2025-04-04 PROCEDURE — 71046 X-RAY EXAM CHEST 2 VIEWS: CPT

## 2025-04-07 ENCOUNTER — NON-APPOINTMENT (OUTPATIENT)
Age: 80
End: 2025-04-07

## 2025-04-09 ENCOUNTER — APPOINTMENT (OUTPATIENT)
Dept: CARDIOLOGY | Facility: CLINIC | Age: 80
End: 2025-04-09
Payer: MEDICARE

## 2025-04-09 ENCOUNTER — TRANSCRIPTION ENCOUNTER (OUTPATIENT)
Age: 80
End: 2025-04-09

## 2025-04-09 VITALS
WEIGHT: 153 LBS | SYSTOLIC BLOOD PRESSURE: 116 MMHG | HEART RATE: 81 BPM | OXYGEN SATURATION: 98 % | DIASTOLIC BLOOD PRESSURE: 72 MMHG | BODY MASS INDEX: 22.59 KG/M2

## 2025-04-09 VITALS — SYSTOLIC BLOOD PRESSURE: 120 MMHG | DIASTOLIC BLOOD PRESSURE: 72 MMHG

## 2025-04-09 DIAGNOSIS — R93.89 ABNORMAL FINDINGS ON DIAGNOSTIC IMAGING OF OTHER SPECIFIED BODY STRUCTURES: ICD-10-CM

## 2025-04-09 DIAGNOSIS — I34.1 NONRHEUMATIC MITRAL (VALVE) PROLAPSE: ICD-10-CM

## 2025-04-09 DIAGNOSIS — I35.0 NONRHEUMATIC AORTIC (VALVE) STENOSIS: ICD-10-CM

## 2025-04-09 DIAGNOSIS — E78.00 PURE HYPERCHOLESTEROLEMIA, UNSPECIFIED: ICD-10-CM

## 2025-04-09 PROCEDURE — 93000 ELECTROCARDIOGRAM COMPLETE: CPT

## 2025-04-09 PROCEDURE — 99204 OFFICE O/P NEW MOD 45 MIN: CPT

## 2025-04-18 ENCOUNTER — NON-APPOINTMENT (OUTPATIENT)
Age: 80
End: 2025-04-18

## 2025-04-18 RX ORDER — AMOXICILLIN 500 MG/1
500 CAPSULE ORAL
Qty: 4 | Refills: 0 | Status: ACTIVE | COMMUNITY
Start: 2025-04-18 | End: 1900-01-01

## 2025-05-08 ENCOUNTER — APPOINTMENT (OUTPATIENT)
Dept: CARDIOLOGY | Facility: CLINIC | Age: 80
End: 2025-05-08
Payer: MEDICARE

## 2025-05-08 VITALS
OXYGEN SATURATION: 94 % | HEART RATE: 87 BPM | DIASTOLIC BLOOD PRESSURE: 74 MMHG | SYSTOLIC BLOOD PRESSURE: 114 MMHG | WEIGHT: 153 LBS | BODY MASS INDEX: 22.59 KG/M2

## 2025-05-08 DIAGNOSIS — E78.00 PURE HYPERCHOLESTEROLEMIA, UNSPECIFIED: ICD-10-CM

## 2025-05-08 DIAGNOSIS — R93.89 ABNORMAL FINDINGS ON DIAGNOSTIC IMAGING OF OTHER SPECIFIED BODY STRUCTURES: ICD-10-CM

## 2025-05-08 DIAGNOSIS — I34.1 NONRHEUMATIC MITRAL (VALVE) PROLAPSE: ICD-10-CM

## 2025-05-08 DIAGNOSIS — I35.0 NONRHEUMATIC AORTIC (VALVE) STENOSIS: ICD-10-CM

## 2025-05-08 DIAGNOSIS — E03.9 HYPOTHYROIDISM, UNSPECIFIED: ICD-10-CM

## 2025-05-08 PROCEDURE — 99213 OFFICE O/P EST LOW 20 MIN: CPT

## 2025-05-08 PROCEDURE — 93320 DOPPLER ECHO COMPLETE: CPT

## 2025-05-08 PROCEDURE — 93351 STRESS TTE COMPLETE: CPT

## 2025-05-20 ENCOUNTER — RX RENEWAL (OUTPATIENT)
Age: 80
End: 2025-05-20

## 2025-06-09 ENCOUNTER — DOCTOR'S OFFICE (OUTPATIENT)
Facility: LOCATION | Age: 80
Setting detail: OPHTHALMOLOGY
End: 2025-06-09
Payer: MEDICARE

## 2025-06-09 DIAGNOSIS — H10.45: ICD-10-CM

## 2025-06-09 DIAGNOSIS — H02.89: ICD-10-CM

## 2025-06-09 PROCEDURE — 99213 OFFICE O/P EST LOW 20 MIN: CPT | Performed by: OPHTHALMOLOGY

## 2025-06-09 ASSESSMENT — REFRACTION_MANIFEST
OD_AXIS: 175
OD_AXIS: 170
OS_AXIS: 165
OD_VA1: 20/30-
OS_SPHERE: +1.75
OS_VA1: 20/20
OD_CYLINDER: +1.50
OS_ADD: +3.25
OS_AXIS: 180
OS_ADD: +3.25
OD_SPHERE: -1.25
OD_VA2: 20/20(J1+)
OD_VA1: 20/20
OS_CYLINDER: +2.00
OD_CYLINDER: +1.75
OD_SPHERE: +1.50
OS_CYLINDER: +1.00
OS_SPHERE: -0.50
OD_ADD: +3.25
OS_VA2: 20/20(J1+)
OS_VA1: 20/30-
OD_ADD: +3.25

## 2025-06-09 ASSESSMENT — REFRACTION_CURRENTRX
OD_CYLINDER: +1.50
OS_SPHERE: +1.75
OD_AXIS: 000
OS_SPHERE: -0.50
OS_VPRISM_DIRECTION: BF
OS_CYLINDER: +1.00
OD_CYLINDER: +1.75
OD_ADD: +3.00
OD_SPHERE: +1.75
OS_ADD: +2.25
OD_AXIS: 170
OS_AXIS: 000
OS_OVR_VA: 20/
OS_CYLINDER: +1.50
OD_SPHERE: -1.25
OS_VPRISM_DIRECTION: BF
OD_VPRISM_DIRECTION: BF
OS_ADD: +3.00
OS_AXIS: 175
OD_ADD: +2.25
OD_OVR_VA: 20/
OD_VPRISM_DIRECTION: BF
OD_OVR_VA: 20/
OS_OVR_VA: 20/

## 2025-06-09 ASSESSMENT — REFRACTION_AUTOREFRACTION
OS_SPHERE: -0.25
OD_CYLINDER: +1.50
OD_SPHERE: -1.25
OS_CYLINDER: +1.25
OD_AXIS: 170
OS_AXIS: 175

## 2025-06-09 ASSESSMENT — KERATOMETRY
METHOD_AUTO_MANUAL: AUTO
OS_K2POWER_DIOPTERS: 43.25
OD_K1POWER_DIOPTERS: 43.25
OD_AXISANGLE_DEGREES: 090
OD_K2POWER_DIOPTERS: 43.25
OS_AXISANGLE_DEGREES: 065
OS_K1POWER_DIOPTERS: 43.00

## 2025-06-09 ASSESSMENT — VISUAL ACUITY
OS_BCVA: 20/20
OD_BCVA: 20/20

## 2025-06-09 ASSESSMENT — LID EXAM ASSESSMENTS
OD_MEIBOMITIS: RLL RUL
OD_COMMENTS: EVERSION OF RUL

## 2025-06-09 ASSESSMENT — LID POSITION - PTOSIS: OD_PTOSIS: RUL

## 2025-08-07 ENCOUNTER — APPOINTMENT (OUTPATIENT)
Dept: CARDIOLOGY | Facility: CLINIC | Age: 80
End: 2025-08-07
Payer: MEDICARE

## 2025-08-07 VITALS
WEIGHT: 156 LBS | SYSTOLIC BLOOD PRESSURE: 110 MMHG | HEART RATE: 54 BPM | BODY MASS INDEX: 23.04 KG/M2 | DIASTOLIC BLOOD PRESSURE: 70 MMHG | OXYGEN SATURATION: 97 %

## 2025-08-07 DIAGNOSIS — E78.00 PURE HYPERCHOLESTEROLEMIA, UNSPECIFIED: ICD-10-CM

## 2025-08-07 DIAGNOSIS — I34.1 NONRHEUMATIC MITRAL (VALVE) PROLAPSE: ICD-10-CM

## 2025-08-07 DIAGNOSIS — E03.9 HYPOTHYROIDISM, UNSPECIFIED: ICD-10-CM

## 2025-08-07 DIAGNOSIS — R73.03 PREDIABETES.: ICD-10-CM

## 2025-08-07 DIAGNOSIS — I35.0 NONRHEUMATIC AORTIC (VALVE) STENOSIS: ICD-10-CM

## 2025-08-07 PROCEDURE — 93000 ELECTROCARDIOGRAM COMPLETE: CPT

## 2025-08-07 PROCEDURE — 99204 OFFICE O/P NEW MOD 45 MIN: CPT

## 2025-08-22 ENCOUNTER — TRANSCRIPTION ENCOUNTER (OUTPATIENT)
Age: 80
End: 2025-08-22

## 2025-08-22 DIAGNOSIS — R05.3 CHRONIC COUGH: ICD-10-CM

## 2025-08-22 RX ORDER — BENZONATATE 100 MG/1
100 CAPSULE ORAL
Qty: 30 | Refills: 0 | Status: ACTIVE | COMMUNITY
Start: 2025-08-22 | End: 1900-01-01

## 2025-09-17 ENCOUNTER — TRANSCRIPTION ENCOUNTER (OUTPATIENT)
Age: 80
End: 2025-09-17

## 2025-09-17 DIAGNOSIS — Z13.820 ENCOUNTER FOR SCREENING FOR OSTEOPOROSIS: ICD-10-CM

## (undated) DEVICE — PACK ANTERIOR SEGMENT

## (undated) DEVICE — CAPSULE RETRACTOR MST

## (undated) DEVICE — GOWN ROYAL SILK XL

## (undated) DEVICE — SOL BALANCE SALT 15ML

## (undated) DEVICE — GLV 7.5 PROTEXIS (WHITE)

## (undated) DEVICE — DRAPE MICROSCOPE KNOB COVER SMALL (2 PCS)

## (undated) DEVICE — CANNULA IRR ANT CHAMBER 30G

## (undated) DEVICE — SOL IRR BAG BSS 500ML

## (undated) DEVICE — SOL IRR BAL SALT 500ML

## (undated) DEVICE — KNIFE ALCON PARACENTESIS CLEARCUT SIDEPORT 1.2MM (YELLOW)

## (undated) DEVICE — PACK CENTURION 2.4MM

## (undated) DEVICE — KNIFE ALCON PARACENTESIS CLEARCUT SIDEPORT 1MM (YELLOW)

## (undated) DEVICE — SUT NYLON 10-0 12" CU-5